# Patient Record
Sex: FEMALE | Race: BLACK OR AFRICAN AMERICAN | Employment: UNEMPLOYED | URBAN - METROPOLITAN AREA
[De-identification: names, ages, dates, MRNs, and addresses within clinical notes are randomized per-mention and may not be internally consistent; named-entity substitution may affect disease eponyms.]

---

## 2017-02-23 ENCOUNTER — TELEPHONE (OUTPATIENT)
Dept: INTERNAL MEDICINE CLINIC | Age: 22
End: 2017-02-23

## 2017-02-23 ENCOUNTER — HOSPITAL ENCOUNTER (OUTPATIENT)
Dept: LAB | Age: 22
Discharge: HOME | End: 2017-02-23
Payer: COMMERCIAL

## 2017-02-23 ENCOUNTER — OFFICE VISIT (OUTPATIENT)
Dept: INTERNAL MEDICINE CLINIC | Age: 22
End: 2017-02-23

## 2017-02-23 VITALS
SYSTOLIC BLOOD PRESSURE: 112 MMHG | BODY MASS INDEX: 19.31 KG/M2 | RESPIRATION RATE: 16 BRPM | DIASTOLIC BLOOD PRESSURE: 64 MMHG | WEIGHT: 109 LBS | TEMPERATURE: 99.3 F | OXYGEN SATURATION: 100 % | HEIGHT: 63 IN | HEART RATE: 67 BPM

## 2017-02-23 DIAGNOSIS — R53.83 FATIGUE, UNSPECIFIED TYPE: ICD-10-CM

## 2017-02-23 DIAGNOSIS — R10.13 EPIGASTRIC PAIN: Primary | ICD-10-CM

## 2017-02-23 DIAGNOSIS — R10.13 EPIGASTRIC PAIN: ICD-10-CM

## 2017-02-23 LAB
ALBUMIN SERPL BCP-MCNC: 3.6 G/DL (ref 3.4–5)
ALBUMIN/GLOB SERPL: 1.1 {RATIO} (ref 0.8–1.7)
ALP SERPL-CCNC: 101 U/L (ref 45–117)
ALT SERPL-CCNC: 15 U/L (ref 13–56)
ANION GAP BLD CALC-SCNC: 6 MMOL/L (ref 3–18)
APPEARANCE UR: CLEAR
AST SERPL W P-5'-P-CCNC: 12 U/L (ref 15–37)
BACTERIA URNS QL MICRO: ABNORMAL /HPF
BASOPHILS # BLD AUTO: 0.1 K/UL (ref 0–0.06)
BASOPHILS # BLD: 1 % (ref 0–2)
BILIRUB SERPL-MCNC: 0.4 MG/DL (ref 0.2–1)
BILIRUB UR QL: NEGATIVE
BUN SERPL-MCNC: 6 MG/DL (ref 7–18)
BUN/CREAT SERPL: 9 (ref 12–20)
CALCIUM SERPL-MCNC: 8.7 MG/DL (ref 8.5–10.1)
CHLORIDE SERPL-SCNC: 107 MMOL/L (ref 100–108)
CO2 SERPL-SCNC: 30 MMOL/L (ref 21–32)
COLOR UR: YELLOW
CREAT SERPL-MCNC: 0.65 MG/DL (ref 0.6–1.3)
DIFFERENTIAL METHOD BLD: ABNORMAL
EOSINOPHIL # BLD: 0.1 K/UL (ref 0–0.4)
EOSINOPHIL NFR BLD: 2 % (ref 0–5)
EPITH CASTS URNS QL MICRO: ABNORMAL /LPF (ref 0–5)
ERYTHROCYTE [DISTWIDTH] IN BLOOD BY AUTOMATED COUNT: 14.6 % (ref 11.6–14.5)
GLOBULIN SER CALC-MCNC: 3.2 G/DL (ref 2–4)
GLUCOSE SERPL-MCNC: 90 MG/DL (ref 74–99)
GLUCOSE UR STRIP.AUTO-MCNC: NEGATIVE MG/DL
HCG URINE, QL. (POC): NEGATIVE
HCT VFR BLD AUTO: 36.1 % (ref 35–45)
HGB BLD-MCNC: 11.6 G/DL (ref 12–16)
HGB UR QL STRIP: NEGATIVE
KETONES UR QL STRIP.AUTO: NEGATIVE MG/DL
LEUKOCYTE ESTERASE UR QL STRIP.AUTO: ABNORMAL
LYMPHOCYTES # BLD AUTO: 43 % (ref 21–52)
LYMPHOCYTES # BLD: 2.4 K/UL (ref 0.9–3.6)
MCH RBC QN AUTO: 29.9 PG (ref 24–34)
MCHC RBC AUTO-ENTMCNC: 32.1 G/DL (ref 31–37)
MCV RBC AUTO: 93 FL (ref 74–97)
MONOCYTES # BLD: 0.4 K/UL (ref 0.05–1.2)
MONOCYTES NFR BLD AUTO: 6 % (ref 3–10)
MUCOUS THREADS URNS QL MICRO: ABNORMAL /LPF
NEUTS SEG # BLD: 2.7 K/UL (ref 1.8–8)
NEUTS SEG NFR BLD AUTO: 48 % (ref 40–73)
NITRITE UR QL STRIP.AUTO: NEGATIVE
PH UR STRIP: 6.5 [PH] (ref 5–8)
PLATELET # BLD AUTO: 163 K/UL (ref 135–420)
PMV BLD AUTO: 12.7 FL (ref 9.2–11.8)
POTASSIUM SERPL-SCNC: 3.8 MMOL/L (ref 3.5–5.5)
PROT SERPL-MCNC: 6.8 G/DL (ref 6.4–8.2)
PROT UR STRIP-MCNC: NEGATIVE MG/DL
RBC # BLD AUTO: 3.88 M/UL (ref 4.2–5.3)
RBC #/AREA URNS HPF: ABNORMAL /HPF (ref 0–5)
SODIUM SERPL-SCNC: 143 MMOL/L (ref 136–145)
SP GR UR REFRACTOMETRY: 1.03 (ref 1–1.03)
TSH SERPL DL<=0.05 MIU/L-ACNC: 0.79 UIU/ML (ref 0.36–3.74)
UROBILINOGEN UR QL STRIP.AUTO: 1 EU/DL (ref 0.2–1)
VALID INTERNAL CONTROL?: YES
WBC # BLD AUTO: 5.7 K/UL (ref 4.6–13.2)
WBC URNS QL MICRO: ABNORMAL /HPF (ref 0–4)

## 2017-02-23 PROCEDURE — 85025 COMPLETE CBC W/AUTO DIFF WBC: CPT | Performed by: FAMILY MEDICINE

## 2017-02-23 PROCEDURE — 80053 COMPREHEN METABOLIC PANEL: CPT | Performed by: FAMILY MEDICINE

## 2017-02-23 PROCEDURE — 84443 ASSAY THYROID STIM HORMONE: CPT | Performed by: FAMILY MEDICINE

## 2017-02-23 PROCEDURE — 36415 COLL VENOUS BLD VENIPUNCTURE: CPT | Performed by: FAMILY MEDICINE

## 2017-02-23 PROCEDURE — 81001 URINALYSIS AUTO W/SCOPE: CPT | Performed by: FAMILY MEDICINE

## 2017-02-23 RX ORDER — OMEPRAZOLE 20 MG/1
20 CAPSULE, DELAYED RELEASE ORAL DAILY
Qty: 30 CAP | Refills: 1 | Status: SHIPPED | OUTPATIENT
Start: 2017-02-23 | End: 2017-11-18

## 2017-02-23 NOTE — PATIENT INSTRUCTIONS

## 2017-02-23 NOTE — PROGRESS NOTES
Pt is here to establish care. C/O stomach issues,  A lot of burping,  epigastric pain x 1 week  L ear pain/throat x 3 days    Do you have an advance directive no  Request Pt bring a copy of advance directive for scanning. Do you want information on an advance directive no    Pt mychart activation pending. 1. Have you been to the ER, urgent care clinic since your last visit? Hospitalized since your last visit? No    2. Have you seen or consulted any other health care providers outside of the [facility] since your last visit? Include any pap smears or colon screening.  No

## 2017-02-23 NOTE — MR AVS SNAPSHOT
Visit Information Date & Time Provider Department Dept. Phone Encounter #  
 2/23/2017 10:30 AM Bjorn Zendejas DO Internists at [facility] [phone] Follow-up Instructions Return in about 1 month (around 3/23/2017) for follow up. Upcoming Health Maintenance Date Due  
 HPV AGE 9Y-34Y (1 of 3 - Female 3 Dose Series) 11/14/2006 INFLUENZA AGE 9 TO ADULT 8/1/2016 DTaP/Tdap/Td series (1 - Tdap) 11/14/2016 PAP AKA CERVICAL CYTOLOGY 11/28/2019 Allergies as of 2/23/2017  Review Complete On: 2/23/2017 By: Bjorn Zendejas DO No Known Allergies Current Immunizations  Never Reviewed No immunizations on file. Not reviewed this visit You Were Diagnosed With   
  
 Codes Comments Epigastric pain    -  Primary ICD-10-CM: R10.13 ICD-9-CM: 789.06 Fatigue, unspecified type     ICD-10-CM: R53.83 ICD-9-CM: 780.79 Vitals BP  
  
  
  
  
  
 112/64 Vitals History BMI and BSA Data Body Mass Index Body Surface Area  
 19.31 kg/m 2 1.48 m 2 Preferred Pharmacy Pharmacy Name Phone [id number] [address] [phone] Your Updated Medication List  
  
   
This list is accurate as of: 2/23/17 11:23 AM.  Always use your most recent med list.  
  
  
  
  
 omeprazole 20 mg capsule Commonly known as:  PRILOSEC Take 1 Cap by mouth daily. Prescriptions Sent to Pharmacy Refills  
 omeprazole (PRILOSEC) 20 mg capsule 1 Sig: Take 1 Cap by mouth daily. Class: Normal  
 Pharmacy: [id number] [address] [facility] Ph #: [phone] Route: Oral  
  
Follow-up Instructions Return in about 1 month (around 3/23/2017) for follow up. To-Do List   
 02/23/2017 Point of Care Testing:  AMB POC URINE PREGNANCY TEST, VISUAL COLOR COMPARISON   
  
 02/23/2017   Lab:  CBC WITH AUTOMATED DIFF   
  
 02/23/2017 Lab:  METABOLIC PANEL, COMPREHENSIVE   
  
 02/23/2017 Lab:  TSH 3RD GENERATION   
  
 02/23/2017 Lab:  URINALYSIS W/MICROSCOPIC Patient Instructions A Healthy Lifestyle: Care Instructions Your Care Instructions A healthy lifestyle can help you feel good, stay at a healthy weight, and have plenty of energy for both work and play. A healthy lifestyle is something you can share with your whole family. A healthy lifestyle also can lower your risk for serious health problems, such as high blood pressure, heart disease, and diabetes. You can follow a few steps listed below to improve your health and the health of your family. Follow-up care is a key part of your treatment and safety. Be sure to make and go to all appointments, and call your doctor if you are having problems. Its also a good idea to know your test results and keep a list of the medicines you take. How can you care for yourself at home? · Do not eat too much sugar, fat, or fast foods. You can still have dessert and treats now and then. The goal is moderation. · Start small to improve your eating habits. Pay attention to portion sizes, drink less juice and soda pop, and eat more fruits and vegetables. ¨ Eat a healthy amount of food. A 3-ounce serving of meat, for example, is about the size of a deck of cards. Fill the rest of your plate with vegetables and whole grains. ¨ Limit the amount of soda and sports drinks you have every day. Drink more water when you are thirsty. ¨ Eat at least 5 servings of fruits and vegetables every day. It may seem like a lot, but it is not hard to reach this goal. A serving or helping is 1 piece of fruit, 1 cup of vegetables, or 2 cups of leafy, raw vegetables. Have an apple or some carrot sticks as an afternoon snack instead of a candy bar. Try to have fruits and/or vegetables at every meal. 
· Make exercise part of your daily routine.  You may want to start with simple activities, such as walking, bicycling, or slow swimming. Try to be active 30 to 60 minutes every day. You do not need to do all 30 to 60 minutes all at once. For example, you can exercise 3 times a day for 10 or 20 minutes. Moderate exercise is safe for most people, but it is always a good idea to talk to your doctor before starting an exercise program. 
· Keep moving. Gildardo Hebert the lawn, work in the garden, or [employer]. Take the stairs instead of the elevator at work. · If you smoke, quit. People who smoke have an increased risk for heart attack, stroke, cancer, and other lung illnesses. Quitting is hard, but there are ways to boost your chance of quitting tobacco for good. ¨ Use nicotine gum, patches, or lozenges. ¨ Ask your doctor about stop-smoking programs and medicines. ¨ Keep trying. In addition to reducing your risk of diseases in the future, you will notice some benefits soon after you stop using tobacco. If you have shortness of breath or asthma symptoms, they will likely get better within a few weeks after you quit. · Limit how much alcohol you drink. Moderate amounts of alcohol (up to 2 drinks a day for men, 1 drink a day for women) are okay. But drinking too much can lead to liver problems, high blood pressure, and other health problems. Family health If you have a family, there are many things you can do together to improve your health. · Eat meals together as a family as often as possible. · Eat healthy foods. This includes fruits, vegetables, lean meats and dairy, and whole grains. · Include your family in your fitness plan. Most people think of activities such as jogging or tennis as the way to fitness, but there are many ways you and your family can be more active. Anything that makes you breathe hard and gets your heart pumping is exercise. Here are some tips: 
¨ Walk to do errands or to take your child to school or the bus. ¨ Go for a family bike ride after dinner instead of watching TV. Where can you learn more? Go to http://odessa-hernandez.info/. Enter I933 in the search box to learn more about \"A Healthy Lifestyle: Care Instructions. \" Current as of: July 26, 2016 Content Version: 11.1 © 8156-3508 [employer]. Care instructions adapted under license by [employer] (which disclaims liability or warranty for this information). If you have questions about a medical condition or this instruction, always ask your healthcare professional. [address] any warranty or liability for your use of this information. Introducing [facility]! [employer] introduces Access Point patient portal. Now you can access parts of your medical record, email your doctor's office, and request medication refills online. 1. In your internet browser, go to [email or web address] 2. Click on the First Time User? Click Here link in the Sign In box. You will see the New Member Sign Up page. 3. Enter your Access Point Access Code exactly as it appears below. You will not need to use this code after youve completed the sign-up process. If you do not sign up before the expiration date, you must request a new code. · Access Point Access Code: [email or web address] Expires: 3/16/2017  9:55 AM 
 
4. Enter the last four digits of your Social Security Number (xxxx) and Date of Birth (mm/dd/yyyy) as indicated and click Submit. You will be taken to the next sign-up page. 5. Create a "[employer]t ID. This will be your Access Point login ID and cannot be changed, so think of one that is secure and easy to remember. 6. Create a Access Point password. You can change your password at any time. 7. Enter your Password Reset Question and Answer. This can be used at a later time if you forget your password. 8. Enter your e-mail address.  You will receive e-mail notification when new information is available in MeetDoctor. 9. Click Sign Up. You can now view and download portions of your medical record. 10. Click the Download Summary menu link to download a portable copy of your medical information. If you have questions, please visit the Frequently Asked Questions section of the MeetDoctor website. Remember, MeetDoctor is NOT to be used for urgent needs. For medical emergencies, dial 911. Now available from your iPhone and Android! Please provide this summary of care documentation to your next provider. Your primary care clinician is listed as Jone Young. If you have any questions after today's visit, please call [phone].

## 2017-02-28 ENCOUNTER — HOSPITAL ENCOUNTER (EMERGENCY)
Age: 22
Discharge: LEFT BEFORE DISCHARGE | End: 2017-02-28
Attending: EMERGENCY MEDICINE
Payer: COMMERCIAL

## 2017-02-28 VITALS
DIASTOLIC BLOOD PRESSURE: 64 MMHG | HEART RATE: 91 BPM | SYSTOLIC BLOOD PRESSURE: 106 MMHG | RESPIRATION RATE: 14 BRPM | BODY MASS INDEX: 20.06 KG/M2 | HEIGHT: 62 IN | TEMPERATURE: 97.9 F | WEIGHT: 109 LBS

## 2017-02-28 LAB
APPEARANCE UR: CLEAR
BACTERIA URNS QL MICRO: ABNORMAL /HPF
BILIRUB UR QL: NEGATIVE
COLOR UR: YELLOW
EPITH CASTS URNS QL MICRO: ABNORMAL /LPF (ref 0–5)
GLUCOSE UR STRIP.AUTO-MCNC: NEGATIVE MG/DL
HCG UR QL: NEGATIVE
HGB UR QL STRIP: NEGATIVE
KETONES UR QL STRIP.AUTO: NEGATIVE MG/DL
LEUKOCYTE ESTERASE UR QL STRIP.AUTO: ABNORMAL
NITRITE UR QL STRIP.AUTO: NEGATIVE
PH UR STRIP: 5 [PH] (ref 5–8)
PROT UR STRIP-MCNC: NEGATIVE MG/DL
RBC #/AREA URNS HPF: ABNORMAL /HPF (ref 0–5)
SP GR UR REFRACTOMETRY: 1.01 (ref 1–1.03)
UROBILINOGEN UR QL STRIP.AUTO: 0.2 EU/DL (ref 0.2–1)
WBC URNS QL MICRO: ABNORMAL /HPF (ref 0–4)

## 2017-02-28 PROCEDURE — 81001 URINALYSIS AUTO W/SCOPE: CPT | Performed by: EMERGENCY MEDICINE

## 2017-02-28 PROCEDURE — 81025 URINE PREGNANCY TEST: CPT | Performed by: EMERGENCY MEDICINE

## 2017-02-28 PROCEDURE — 75810000275 HC EMERGENCY DEPT VISIT NO LEVEL OF CARE

## 2017-02-28 NOTE — PROGRESS NOTES
HISTORY OF PRESENT ILLNESS  Aristeo Mosher is a 24 y.o. female. HPI   She reports 1 week of CP, burping, flatulence, epigastric pain, intermittent CP 5/10, and constant stomach pain. She has not tried any OTC medications  Her diet today was [place], egg and cheese biscuits. Last evening she had lasagna and had symptoms. She says she is \"always tired\" for the past few months. She says she gets 9 hours of sleep  Her left sided throat and ear pain had resolved. LMP 17,  (2 abortions)  Last pap was 2 months ago    No Known Allergies    Past Medical History:   Diagnosis Date    Asthma 1998    Dysmenorrhea in the adolescent 2011    Gonorrhea        Family History   Problem Relation Age of Onset    Hypertension Paternal Grandmother     Diabetes Paternal Grandmother     Asthma Paternal Grandmother     Stroke Paternal Grandmother     Asthma Father     Asthma Brother     Asthma Paternal Uncle        Social History   Substance Use Topics    Smoking status: Never Smoker    Smokeless tobacco: Never Used    Alcohol use Yes      Comment: occassional use        Current Outpatient Prescriptions   Medication Sig    omeprazole (PRILOSEC) 20 mg capsule Take 1 Cap by mouth daily. No current facility-administered medications for this visit. History reviewed. No pertinent surgical history. ROS  Constitutional: Negative for fever and chills. HENT: C/o earache and sore throat. Negative for tinnitus. Eyes: Negative for blurred vision and double vision. Respiratory: C/o SOB. Negative for cough   Cardiovascular: Negative for chest pain and leg swelling. Gastrointestinal: c/o change in bowel habits and black tarry stools. Negative for nausea, vomiting and diarrhea. Genitourinary: C/o dysuria, vaginal d/c, and urgency. Negative for dysuria and flank pain. Musculoskeletal: C/o neck pain and back pain. Negative for myalgias and joint pain. Neurological: C/o headaches. Negative for dizziness, tremors, sensory change, speech change and focal weakness    Psychiatric/Behavioral: Negative for depression and suicidal ideas. Visit Vitals    /64    Pulse 67    Temp 99.3 °F (37.4 °C) (Oral)    Resp 16    Ht 5' 3\" (1.6 m)    Wt 109 lb (49.4 kg)    LMP 02/06/2017 (Exact Date)    SpO2 100%    Breastfeeding No    BMI 19.31 kg/m2     Physical Exam  Constitutional: Obese. Patient is oriented to person, place, and time and well-developed, well-nourished, and in no distress. Head: Normocephalic and atraumatic. Right Ear: ear normal.   Left Ear: ear normal.   Eyes: Pupils are equal, round, and reactive to light. Neck/thyroid: no thyromegaly or masses. Normal range of motion. Cardiovascular: Normal rate, regular rhythm, normal heart sounds and intact distal pulses. No murmur heard. Pulmonary/Chest: Effort normal and breath sounds normal. No respiratory distress. Musculoskeletal: Normal range of motion. No edema. Neurological: Reflexes intact and symetrical.  he is alert and oriented to person, place, and time. Gait normal.   Skin: Skin is warm and dry. Psychiatric: Mood, memory, affect and judgment normal.   ASSESSMENT and PLAN    ICD-10-CM ICD-9-CM    1. Epigastric pain R10.13 789.06 omeprazole (PRILOSEC) 20 mg capsule      CBC WITH AUTOMATED DIFF      METABOLIC PANEL, COMPREHENSIVE      URINALYSIS W/MICROSCOPIC      AMB POC URINE PREGNANCY TEST, VISUAL COLOR COMPARISON   2. Fatigue, unspecified type R53.83 780.79 TSH 3RD GENERATION      AMB POC URINE PREGNANCY TEST, VISUAL COLOR COMPARISON      CANCELED: AMB POC URINE PREGNANCY TEST, VISUAL COLOR COMPARISON     -Send for old records  -RTC 1 month follow up     Additional Instructions: The patient understands that they should contact the office at any time if any questions or concerns develop.   They are also aware that they can call our main office number at [phone] at any time if they would like to address any concerns with the physician. They also understand that they should dial 911 if any acute emergency arises. The patient understands that they should give us a minimum of 48 hours to complete prescription refills once they are requested. The patient has also been instructed to contact us by calling the main office number if they have not received feedback within 2 weeks of having any tests completed. The patient is a aware that they should read all package insert information when picking up the medications and that they should consult the pharmacist of a physician if they have any questions or concerns regarding the prescribed medications. Discussed with the patient new medications given and patient instructed to read pharmacy literature regarding side effects and drug interactions. Instructions for taking the medications were provided to the patient and the consequences of not taking it. Follow-up Disposition:   Return if symptoms worsen or fail to improve. Risk and benefits of new medication discussed in detail when indicated, patient was given the opportunity to ask questions   AVS provided  reviewed diet, exercise and weight control when indicated  Alarm signals discussed. ER precautions reviewed when indicated  Plan of care reviewed with patient. Understanding verbalized and they are in agreement with plan of care.      Marvin Cornejo, DO

## 2017-02-28 NOTE — ED NOTES
No answer in the [address] [place] [address] twice, did not notify any one that she was leaving.

## 2017-03-16 ENCOUNTER — OFFICE VISIT (OUTPATIENT)
Dept: OBGYN CLINIC | Age: 22
End: 2017-03-16

## 2017-03-16 VITALS
WEIGHT: 107 LBS | DIASTOLIC BLOOD PRESSURE: 77 MMHG | SYSTOLIC BLOOD PRESSURE: 116 MMHG | HEART RATE: 99 BPM | BODY MASS INDEX: 19.69 KG/M2 | HEIGHT: 62 IN

## 2017-03-16 DIAGNOSIS — N89.8 VAGINAL DISCHARGE: ICD-10-CM

## 2017-03-16 DIAGNOSIS — Z11.3 SCREEN FOR STD (SEXUALLY TRANSMITTED DISEASE): Primary | ICD-10-CM

## 2017-03-16 NOTE — PROGRESS NOTES
SUBJ: Ms. Barbara Jerome is a 24 y.o. y/o female, , who presents today for     Chief Complaint   Patient presents with    Exposure to STD       Her LMP was Patient's last menstrual period was 2017. rAya Garrett presents for STD screen. Patient is no longer pregnant following elective termination, menses has resumed regularly without difficulty. She states that she has a \"milky\" vaginal discharge with yellow hue. Denies itching, odor, dysuria or change in sex partner. No abnormal bleeding or pain during/after sex. No known STI contact per pt. Past Medical History:   Diagnosis Date    Asthma 0    Dysmenorrhea in the adolescent 2011    Gonorrhea       History reviewed. No pertinent surgical history. Family History   Problem Relation Age of Onset    Hypertension Paternal Grandmother     Diabetes Paternal Grandmother     Asthma Paternal Grandmother     Stroke Paternal Grandmother     Asthma Father     Asthma Brother     Asthma Paternal Uncle      Social History     Social History    Marital status: SINGLE     Spouse name: N/A    Number of children: N/A    Years of education: N/A     Social History Main Topics    Smoking status: Never Smoker    Smokeless tobacco: Never Used    Alcohol use Yes      Comment: occassional use    Drug use: No      Comment: denies    Sexual activity: Yes     Partners: Male     Birth control/ protection: Condom     Other Topics Concern    None     Social History Narrative         PE:   Visit Vitals    /77 (BP 1 Location: Left arm, BP Patient Position: Sitting)    Pulse 99    Ht 5' 2\" (1.575 m)    Wt 107 lb (48.5 kg)    LMP 2017    BMI 19.57 kg/m2       Pt is a well developed female who is alert and oriented x 3. CVS exam: normal rate, regular rhythm, normal S1, S2, no murmurs, rubs, clicks or gallops.   Lungs: clear bilaterally to auscultation, no wheezing, rhonci or rales  Pelvic exam: VULVA: normal appearing vulva with no masses, tenderness or lesions, VAGINA: vaginal discharge - white, CERVIX: normal appearing cervix without discharge or lesions, DNA probe for chlamydia and GC obtained, cervical motion tenderness absent, UTERUS: uterus is normal size, shape, consistency and nontender, ADNEXA: normal adnexa in size, nontender and no masses. Assessment/Plan:       1. Vaginal discharge    - CT/NG/T.VAGINALIS AMPLIFICATION; Future --> pending    2.  Screen for STD (sexually transmitted disease)  Discussed safe sex   Declines hormonal birth control at this time    RCT annually and prn

## 2017-03-16 NOTE — PATIENT INSTRUCTIONS
Safer Sex: Care Instructions  Your Care Instructions  Safer sex is a way to reduce your risk of getting an infection spread through sex. It can also help prevent pregnancy. Most infections that are spread through sex, also called sexually transmitted infections or STIs, can be cured. But some can decrease your chances of getting pregnant if they are not treated early. Others, such as herpes, have no cure. And some, such as HIV, can be deadly. Several products can help you practice safer sex and reduce your chance of STIs. One of the best is a condom. There are condoms for men and for women. The female condom is a tube of soft plastic with a closed end that is placed deep into the vagina. You can use a special rubber sheet (dental dam) for protection during oral sex. Latex gloves can keep your hands from touching blood, semen, or other body fluids that can carry infections. Remember that birth control methods such as diaphragms, IUDs, foams, and birth control pills do not stop you from getting STIs. Follow-up care is a key part of your treatment and safety. Be sure to make and go to all appointments, and call your doctor if you are having problems. Its also a good idea to know your test results and keep a list of the medicines you take. How can you care for yourself at home? · Think about getting shots to prevent hepatitis A and hepatitis B. These two diseases can be spread through sex. You also can get hepatitis A if you eat infected food. · Use condoms or female condoms each time and every time you have sex. · Learn the right way to use a male condom:  ¨ Condoms come in several sizes. Make sure you use the right size. A condom that is too small can break easily. A condom that is too big can slip off during sex. Use a new condom each time you have sex. ¨ Be careful not to poke a hole in the condom when you open the wrapper. ¨ Squeeze the tip of the condom to keep out air.   ¨ Pull down the loose skin (foreskin) from the head of an uncircumcised penis. ¨ While squeezing the tip of the condom, unroll it all the way down to the base of the firm penis. ¨ Never use petroleum jelly (such as Vaseline), grease, hand lotion, baby oil, or anything with oil in it. These products can make holes in the condom. ¨ After sex, hold the condom on your penis as you remove your penis from your partner. This will keep semen from spilling out of the condom. · Learn to use a female condom:  ¨ You can put in a female condom up to 8 hours before sex. ¨ Squeeze the smaller ring at the closed end and insert it deep into the vagina. The larger ring at the open end should stay outside the vagina. ¨ During sex, make sure the penis goes into the condom. ¨ After the penis is removed, close the open end of the condom by twisting it. Remove the condom. · Do not use a female condom and male condom at the same time. · Do not have sex with anyone who has symptoms of an STI, such as sores on the genitals or mouth. The herpes virus that causes cold sores can spread to and from the penis and vagina. · Do not drink a lot of alcohol or use drugs before sex. This can cause you to let down your guard and not practice safer sex. · Having one sex partner (who does not have STIs and does not have sex with anyone else) is a sure way to avoid STIs. · Talk to your partner before you have sex. Find out if he or she has or is at risk for any STI. Keep in mind that a person may be able to spread an STI even if he or she does not have symptoms. You and your partner may want to get an HIV test. You should get tested again 6 months later. Where can you learn more? Go to http://odessa-hernandez.info/. Enter M362 in the search box to learn more about \"Safer Sex: Care Instructions. \"  Current as of: May 27, 2016  Content Version: 11.1  © 2116-0664 [employer].  Care instructions adapted under license by [employer] (which disclaims liability or warranty for this information). If you have questions about a medical condition or this instruction, always ask your healthcare professional. [address] any warranty or liability for your use of this information.

## 2017-03-18 LAB
C TRACH RRNA SPEC QL NAA+PROBE: POSITIVE
N GONORRHOEA RRNA SPEC QL NAA+PROBE: POSITIVE
T VAGINALIS RRNA SPEC QL NAA+PROBE: NEGATIVE

## 2017-03-20 ENCOUNTER — TELEPHONE (OUTPATIENT)
Dept: OBGYN CLINIC | Age: 22
End: 2017-03-20

## 2017-03-20 DIAGNOSIS — A64 STI (SEXUALLY TRANSMITTED INFECTION): Primary | ICD-10-CM

## 2017-03-20 RX ORDER — AZITHROMYCIN 250 MG/1
TABLET, FILM COATED ORAL
Qty: 4 TAB | Refills: 0 | Status: SHIPPED | OUTPATIENT
Start: 2017-03-20 | End: 2017-03-25

## 2017-03-20 NOTE — PROGRESS NOTES
Please notify patient of positive STI results. Rx has been sent to her pharmacy, and she will also need to come back to the office ASAP for rocephin injection. All partners should be treated and she should abstain from sex for 7-10 days. Please encourage condoms and safe sex practices.

## 2017-03-20 NOTE — LETTER
3/20/2017 9:40 AM 
 
Ms. Ankur Merrill [address] [phone] Dear Ms. Barboza, We have been unable to reach you by phone to notify you of your test results. Please call our office at [phone] and ask to speak with my nurse in order to explain these results to you and advise you of any recommendations. Sincerely, Eliz Washington CNM

## 2017-03-20 NOTE — TELEPHONE ENCOUNTER
----- Message from Wilma Banks CNM sent at 3/20/2017  9:09 AM EDT -----  Please notify patient of positive STI results. Rx has been sent to her pharmacy, and she will also need to come back to the office ASAP for rocephin injection. All partners should be treated and she should abstain from sex for 7-10 days. Please encourage condoms and safe sex practices.

## 2017-05-03 ENCOUNTER — HOSPITAL ENCOUNTER (EMERGENCY)
Age: 22
Discharge: HOME | End: 2017-05-03
Attending: EMERGENCY MEDICINE
Payer: COMMERCIAL

## 2017-05-03 VITALS
HEIGHT: 62 IN | BODY MASS INDEX: 21.16 KG/M2 | RESPIRATION RATE: 18 BRPM | DIASTOLIC BLOOD PRESSURE: 65 MMHG | WEIGHT: 115 LBS | SYSTOLIC BLOOD PRESSURE: 126 MMHG | TEMPERATURE: 98.2 F | OXYGEN SATURATION: 100 % | HEART RATE: 78 BPM

## 2017-05-03 DIAGNOSIS — R10.2 PELVIC PAIN: Primary | ICD-10-CM

## 2017-05-03 DIAGNOSIS — Z20.2 POSSIBLE EXPOSURE TO STD: ICD-10-CM

## 2017-05-03 LAB
APPEARANCE UR: CLEAR
BACTERIA URNS QL MICRO: ABNORMAL /HPF
BILIRUB UR QL: NEGATIVE
COLOR UR: YELLOW
EPITH CASTS URNS QL MICRO: ABNORMAL /LPF (ref 0–5)
GLUCOSE UR STRIP.AUTO-MCNC: NEGATIVE MG/DL
HCG UR QL: NEGATIVE
HGB UR QL STRIP: ABNORMAL
KETONES UR QL STRIP.AUTO: ABNORMAL MG/DL
LEUKOCYTE ESTERASE UR QL STRIP.AUTO: NEGATIVE
NITRITE UR QL STRIP.AUTO: NEGATIVE
PH UR STRIP: 6 [PH] (ref 5–8)
PROT UR STRIP-MCNC: NEGATIVE MG/DL
RBC #/AREA URNS HPF: ABNORMAL /HPF (ref 0–5)
SERVICE CMNT-IMP: NORMAL
SP GR UR REFRACTOMETRY: 1.03 (ref 1–1.03)
UROBILINOGEN UR QL STRIP.AUTO: 1 EU/DL (ref 0.2–1)
WBC URNS QL MICRO: ABNORMAL /HPF (ref 0–4)
WET PREP GENITAL: NORMAL

## 2017-05-03 PROCEDURE — 74011000250 HC RX REV CODE- 250: Performed by: PHYSICIAN ASSISTANT

## 2017-05-03 PROCEDURE — 74011250636 HC RX REV CODE- 250/636: Performed by: PHYSICIAN ASSISTANT

## 2017-05-03 PROCEDURE — 87210 SMEAR WET MOUNT SALINE/INK: CPT | Performed by: PHYSICIAN ASSISTANT

## 2017-05-03 PROCEDURE — 99284 EMERGENCY DEPT VISIT MOD MDM: CPT

## 2017-05-03 PROCEDURE — 74011250637 HC RX REV CODE- 250/637: Performed by: PHYSICIAN ASSISTANT

## 2017-05-03 PROCEDURE — 81001 URINALYSIS AUTO W/SCOPE: CPT | Performed by: PHYSICIAN ASSISTANT

## 2017-05-03 PROCEDURE — 87491 CHLMYD TRACH DNA AMP PROBE: CPT | Performed by: PHYSICIAN ASSISTANT

## 2017-05-03 PROCEDURE — 96372 THER/PROPH/DIAG INJ SC/IM: CPT

## 2017-05-03 PROCEDURE — 81025 URINE PREGNANCY TEST: CPT | Performed by: PHYSICIAN ASSISTANT

## 2017-05-03 RX ORDER — AZITHROMYCIN 250 MG/1
1000 TABLET, FILM COATED ORAL
Status: COMPLETED | OUTPATIENT
Start: 2017-05-03 | End: 2017-05-03

## 2017-05-03 RX ORDER — CEFTRIAXONE 250 MG/8ML
250 INJECTION, POWDER, FOR SOLUTION INTRAMUSCULAR; INTRAVENOUS ONCE
Status: DISCONTINUED | OUTPATIENT
Start: 2017-05-03 | End: 2017-05-03

## 2017-05-03 RX ADMIN — LIDOCAINE HYDROCHLORIDE 250 MG: 10 INJECTION, SOLUTION EPIDURAL; INFILTRATION; INTRACAUDAL; PERINEURAL at 22:19

## 2017-05-03 RX ADMIN — AZITHROMYCIN 1000 MG: 250 TABLET, FILM COATED ORAL at 22:19

## 2017-05-04 LAB
C TRACH RRNA SPEC QL NAA+PROBE: POSITIVE
N GONORRHOEA RRNA SPEC QL NAA+PROBE: NEGATIVE
SPECIMEN SOURCE: ABNORMAL

## 2017-05-04 NOTE — ED TRIAGE NOTES
Pt states she is having lower abdominal pain that started last night, pt states she is currently on her period.

## 2017-05-04 NOTE — ED PROVIDER NOTES
HPI Comments: 19yo female presents to ER complaining of pelvic discomfort, concerns about STD, and requesting treatment for STD. Admits to recent history of gonorrhea, treated, thinks her partner was treated, and admits to unprotected sex with same partner that gave her gonorrhea. Patient states she was treated. No urinary complaints, no fever/chills, N/V, abdominal pain. States LMP ended several days ago. Admits to discomfort with intercourse. According to chart reviewed she was positive for both chlamydia and gonorrhea in March. Patient is a 24 y.o. female presenting with abdominal pain and other event. Abdominal Pain    Pertinent negatives include no fever, no nausea, no vomiting, no dysuria and no back pain. Other   Pertinent negatives include no abdominal pain. Past Medical History:   Diagnosis Date    Asthma 0    Dysmenorrhea in the adolescent 8/2/2011    Gonorrhea        History reviewed. No pertinent surgical history. Family History:   Problem Relation Age of Onset    Hypertension Paternal Grandmother     Diabetes Paternal Grandmother     Asthma Paternal Grandmother     Stroke Paternal Grandmother     Asthma Father     Asthma Brother     Asthma Paternal Uncle        Social History     Social History    Marital status: SINGLE     Spouse name: N/A    Number of children: N/A    Years of education: N/A     Occupational History    Not on file. Social History Main Topics    Smoking status: Never Smoker    Smokeless tobacco: Never Used    Alcohol use Yes      Comment: occassional use    Drug use: No      Comment: denies    Sexual activity: Yes     Partners: Male     Birth control/ protection: Condom     Other Topics Concern    Not on file     Social History Narrative         ALLERGIES: Review of patient's allergies indicates no known allergies. Review of Systems   Constitutional: Negative for activity change, appetite change, chills and fever.    HENT: Negative. Respiratory: Negative. Gastrointestinal: Negative for abdominal pain, nausea and vomiting. Genitourinary: Positive for dyspareunia, pelvic pain, vaginal bleeding and vaginal discharge. Negative for difficulty urinating, dysuria, menstrual problem and vaginal pain. Musculoskeletal: Negative. Negative for back pain and gait problem. Neurological: Negative. All other systems reviewed and are negative. Vitals:    05/03/17 2110   BP: 126/65   Pulse: 78   Resp: 18   Temp: 98.2 °F (36.8 °C)   SpO2: 100%   Weight: 52.2 kg (115 lb)   Height: 5' 2\" (1.575 m)            Physical Exam   Constitutional: She is oriented to person, place, and time. She appears well-developed and well-nourished. No distress. HENT:   Mouth/Throat: Oropharynx is clear and moist.   Neck: Normal range of motion. Pulmonary/Chest: Effort normal.   Abdominal: Soft. Bowel sounds are normal. She exhibits no distension and no mass. There is tenderness. There is no rebound, no guarding and no CVA tenderness. Musculoskeletal: Normal range of motion. Neurological: She is alert and oriented to person, place, and time. Skin: She is not diaphoretic. Nursing note and vitals reviewed. MDM  Number of Diagnoses or Management Options  Diagnosis management comments: 17yo female with concerns about STD. Mild CMT, scant clear mucousy discharge mixed with old blood (patient stating her cycle ended a few days ago). Will check UA, UPT, wet prep, STD cultures. Empiric treatment with zithromax and rocephin. STD clinic referral.     ED Course       Pelvic Exam  Date/Time: 5/3/2017 9:45 PM  Procedure duration:  5 minutes. Exam assisted by:  SARAN Byrd RN. Type of exam performed: bimanual and speculum. External genitalia appearance: normal.    Vaginal exam:  discharge. The amount of discharge was:  scant. The discharge was mucousy, thin and bloody (Clear).    Cervical exam:  discharge from cervix, os closed and minimal cervical motion tenderness. Specimen(s) collected:  chlamydia and GC (wet prep).   Bimanual exam:  normal.

## 2017-11-18 ENCOUNTER — HOSPITAL ENCOUNTER (EMERGENCY)
Age: 22
Discharge: HOME | End: 2017-11-18
Attending: EMERGENCY MEDICINE
Payer: COMMERCIAL

## 2017-11-18 VITALS
RESPIRATION RATE: 20 BRPM | DIASTOLIC BLOOD PRESSURE: 44 MMHG | BODY MASS INDEX: 21.53 KG/M2 | OXYGEN SATURATION: 98 % | HEART RATE: 81 BPM | TEMPERATURE: 99 F | SYSTOLIC BLOOD PRESSURE: 105 MMHG | HEIGHT: 62 IN | WEIGHT: 117 LBS

## 2017-11-18 DIAGNOSIS — J02.9 ACUTE PHARYNGITIS, UNSPECIFIED ETIOLOGY: Primary | ICD-10-CM

## 2017-11-18 PROCEDURE — 99282 EMERGENCY DEPT VISIT SF MDM: CPT

## 2017-11-18 PROCEDURE — 96372 THER/PROPH/DIAG INJ SC/IM: CPT

## 2017-11-18 PROCEDURE — 74011250636 HC RX REV CODE- 250/636: Performed by: PHYSICIAN ASSISTANT

## 2017-11-18 RX ADMIN — PENICILLIN G BENZATHINE 2.4 MILLION UNITS: 2400000 INJECTION, SUSPENSION INTRAMUSCULAR at 13:21

## 2017-11-18 NOTE — ED NOTES
Patient armband removed and shredded. Pt left before shot time discharge. Pt seen leaving department by another nurse, in stable condition and in no acute distress.

## 2017-11-18 NOTE — DISCHARGE INSTRUCTIONS
Rapid Strep Test: About This Test  What is it? A rapid strep test checks the bacteria in your throat to see if strep is the cause of your sore throat. Why is this test done? It may be done so your doctor can find out right away whether you have strep throat. There is another test for strep, called a throat culture, but that test takes a few days to get the results. How can you prepare for the test?  You don't need to do anything before you have this test.  What happens during the test?  · You will be asked to tilt your head back and open your mouth as wide as possible. · Your doctor will press your tongue down with a flat stick (tongue depressor) and then examine your mouth and throat. · A clean cotton swab will be rubbed over the back of your throat, around your tonsils, and over any red areas or sores to collect a sample. How long does the test take? · The test takes less than a minute. · Results are available in 10 to 15 minutes. Follow-up care is a key part of your treatment and safety. Be sure to make and go to all appointments, and call your doctor if you are having problems. It's also a good idea to keep a list of the medicines you take. Ask your doctor when you can expect to have your test results. Where can you learn more? Go to http://odessa-hernandez.info/. Enter B356 in the search box to learn more about \"Rapid Strep Test: About This Test.\"  Current as of: May 12, 2017  Content Version: 11.4  © 3178-1678 Healthwise, [facility]. Care instructions adapted under license by [employer] (which disclaims liability or warranty for this information). If you have questions about a medical condition or this instruction, always ask your healthcare professional. [address] any warranty or liability for your use of this information.

## 2017-11-18 NOTE — ED NOTES
Pt medicated per order, without incidient. Pt tolerated well. As mentioned, medication divided into 2 equal dosages for administration. Pt aware of shot time discharge. Pt in NAD.

## 2017-11-18 NOTE — ED PROVIDER NOTES
Patient is a 25 y.o. female presenting with sore throat and general illness. The history is provided by the patient. Sore Throat    This is a new problem. The current episode started yesterday. The problem has not changed since onset. There has been no fever. Associated symptoms include swollen glands. Pertinent negatives include no diarrhea, no vomiting, no congestion, no drooling, no ear discharge, no ear pain, no headaches, no plugged ear sensation, no shortness of breath, no trouble swallowing, no stiff neck and no cough. She has had exposure to strep. She has tried nothing for the symptoms. The treatment provided no relief. Generalized Body Aches   Pertinent negatives include no chest pain, no abdominal pain, no headaches and no shortness of breath. Past Medical History:   Diagnosis Date    Asthma 0    Dysmenorrhea in the adolescent 8/2/2011    Gonorrhea        History reviewed. No pertinent surgical history. Family History:   Problem Relation Age of Onset    Hypertension Paternal Grandmother     Diabetes Paternal Grandmother     Asthma Paternal Grandmother     Stroke Paternal Grandmother     Asthma Father     Asthma Brother     Asthma Paternal Uncle        Social History     Social History    Marital status: SINGLE     Spouse name: N/A    Number of children: N/A    Years of education: N/A     Occupational History    Not on file. Social History Main Topics    Smoking status: Never Smoker    Smokeless tobacco: Never Used    Alcohol use Yes      Comment: occassional use    Drug use: No      Comment: denies    Sexual activity: Yes     Partners: Male     Birth control/ protection: Condom     Other Topics Concern    Not on file     Social History Narrative         ALLERGIES: Review of patient's allergies indicates no known allergies. Review of Systems   Constitutional: Negative for chills and fever. HENT: Positive for sore throat.  Negative for congestion, drooling, ear discharge, ear pain, facial swelling and trouble swallowing. Eyes: Negative for discharge and redness. Respiratory: Negative for cough and shortness of breath. Cardiovascular: Negative for chest pain. Gastrointestinal: Negative for abdominal pain, diarrhea, nausea and vomiting. Endocrine: Negative. Genitourinary: Negative. Musculoskeletal: Negative for back pain and neck pain. Skin: Negative for rash and wound. Allergic/Immunologic: Negative. Neurological: Negative for dizziness, light-headedness and headaches. Hematological: Negative. Psychiatric/Behavioral: Negative. Vitals:    11/18/17 1253   BP: 105/44   Pulse: 81   Resp: 20   Temp: 99 °F (37.2 °C)   SpO2: 98%   Weight: 53.1 kg (117 lb)   Height: 5' 2\" (1.575 m)            Physical Exam   Constitutional: She is oriented to person, place, and time. She appears well-developed and well-nourished. No distress. HENT:   Head: Normocephalic and atraumatic. Right Ear: External ear normal.   Left Ear: External ear normal.   Nose: Nose normal.   Mouth/Throat: Oropharyngeal exudate present. Oral pharynx erythema  No abscess   Eyes: Conjunctivae and EOM are normal.   Neck: Normal range of motion. Neck supple. Cardiovascular: Normal rate, regular rhythm and normal heart sounds. Pulmonary/Chest: Effort normal and breath sounds normal. No respiratory distress. She has no wheezes. She exhibits no tenderness. Abdominal: Soft. She exhibits no distension. There is no tenderness. Musculoskeletal: Normal range of motion. Lymphadenopathy:     She has cervical adenopathy. Neurological: She is alert and oriented to person, place, and time. No cranial nerve deficit. Coordination normal.   Skin: Skin is warm and dry. No rash noted. She is not diaphoretic. Psychiatric: She has a normal mood and affect. Nursing note and vitals reviewed.        MDM  Number of Diagnoses or Management Options  Acute pharyngitis, unspecified etiology: Diagnosis management comments: centor criteria applied and will tx pt for strep   pcn shot given in ED    ED Course       Procedures

## 2017-12-12 ENCOUNTER — HOSPITAL ENCOUNTER (EMERGENCY)
Age: 22
Discharge: LEFT BEFORE DISCHARGE | End: 2017-12-12
Attending: EMERGENCY MEDICINE
Payer: COMMERCIAL

## 2017-12-12 VITALS
TEMPERATURE: 98.5 F | RESPIRATION RATE: 20 BRPM | WEIGHT: 120 LBS | DIASTOLIC BLOOD PRESSURE: 65 MMHG | HEIGHT: 62 IN | SYSTOLIC BLOOD PRESSURE: 110 MMHG | BODY MASS INDEX: 22.08 KG/M2 | HEART RATE: 80 BPM | OXYGEN SATURATION: 98 %

## 2017-12-12 DIAGNOSIS — J45.21 MILD INTERMITTENT ASTHMA WITH ACUTE EXACERBATION: Primary | ICD-10-CM

## 2017-12-12 LAB
APPEARANCE UR: CLEAR
BILIRUB UR QL: NEGATIVE
COLOR UR: YELLOW
GLUCOSE UR STRIP.AUTO-MCNC: NEGATIVE MG/DL
HCG UR QL: NEGATIVE
HGB UR QL STRIP: NEGATIVE
KETONES UR QL STRIP.AUTO: 40 MG/DL
LEUKOCYTE ESTERASE UR QL STRIP.AUTO: NEGATIVE
NITRITE UR QL STRIP.AUTO: NEGATIVE
PH UR STRIP: 7.5 [PH] (ref 5–8)
PROT UR STRIP-MCNC: NEGATIVE MG/DL
SP GR UR REFRACTOMETRY: 1.02 (ref 1–1.03)
UROBILINOGEN UR QL STRIP.AUTO: 1 EU/DL (ref 0.2–1)

## 2017-12-12 PROCEDURE — 74011636637 HC RX REV CODE- 636/637: Performed by: PHYSICIAN ASSISTANT

## 2017-12-12 PROCEDURE — 99282 EMERGENCY DEPT VISIT SF MDM: CPT

## 2017-12-12 PROCEDURE — 94640 AIRWAY INHALATION TREATMENT: CPT

## 2017-12-12 PROCEDURE — 81025 URINE PREGNANCY TEST: CPT | Performed by: PHYSICIAN ASSISTANT

## 2017-12-12 PROCEDURE — 77030029684 HC NEB SM VOL KT MONA -A

## 2017-12-12 PROCEDURE — 74011250637 HC RX REV CODE- 250/637: Performed by: PHYSICIAN ASSISTANT

## 2017-12-12 PROCEDURE — 81003 URINALYSIS AUTO W/O SCOPE: CPT | Performed by: PHYSICIAN ASSISTANT

## 2017-12-12 PROCEDURE — 74011000250 HC RX REV CODE- 250: Performed by: PHYSICIAN ASSISTANT

## 2017-12-12 RX ORDER — IBUPROFEN 400 MG/1
400 TABLET ORAL
Status: COMPLETED | OUTPATIENT
Start: 2017-12-12 | End: 2017-12-12

## 2017-12-12 RX ORDER — PREDNISONE 20 MG/1
60 TABLET ORAL
Status: COMPLETED | OUTPATIENT
Start: 2017-12-12 | End: 2017-12-12

## 2017-12-12 RX ORDER — IPRATROPIUM BROMIDE AND ALBUTEROL SULFATE 2.5; .5 MG/3ML; MG/3ML
3 SOLUTION RESPIRATORY (INHALATION)
Status: COMPLETED | OUTPATIENT
Start: 2017-12-12 | End: 2017-12-12

## 2017-12-12 RX ADMIN — PREDNISONE 60 MG: 20 TABLET ORAL at 13:41

## 2017-12-12 RX ADMIN — IPRATROPIUM BROMIDE AND ALBUTEROL SULFATE 3 ML: .5; 3 SOLUTION RESPIRATORY (INHALATION) at 14:13

## 2017-12-12 RX ADMIN — IPRATROPIUM BROMIDE AND ALBUTEROL SULFATE 3 ML: .5; 3 SOLUTION RESPIRATORY (INHALATION) at 13:55

## 2017-12-12 RX ADMIN — IBUPROFEN 400 MG: 400 TABLET ORAL at 14:17

## 2017-12-12 RX ADMIN — IPRATROPIUM BROMIDE AND ALBUTEROL SULFATE 3 ML: .5; 3 SOLUTION RESPIRATORY (INHALATION) at 13:42

## 2017-12-12 NOTE — ED PROVIDER NOTES
HPI Comments: Guerrero Cox is a 25 y.o. Female with hx of asthma c/o SOB and productive cough with clear sputum starting yesterday. She c/o wheezing with SOB. States it felt like an asthma attack. Pt did not have an inhaler at home. Last asthma exacerbation requiring ER visit was as a child. No intubations. She c/o rhinorrhea and sick contact with cold symptoms. Pt has not tried anything at home. She denies fever or chills. No CP, palps. No sore throat. Pt eating normally. She c/o bilateral side pain also starting yesterday after coughing. No urinary symptoms, flank pain, abd pain, n/v/d. Pt states pain is aching, 6/10. Pt requesting medication for side pain, has not taken anything at home. Patient is a 25 y.o. female presenting with cough. The history is provided by the patient. No  was used. Cough   This is a new problem. The current episode started yesterday. The problem occurs constantly. The problem has not changed since onset. The cough is productive of sputum. There has been no fever. Associated symptoms include rhinorrhea, sore throat, myalgias, shortness of breath and wheezing. Pertinent negatives include no chest pain, no chills, no sweats, no weight loss, no eye redness, no ear congestion, no ear pain, no headaches, no nausea and no vomiting. She has tried nothing for the symptoms. She is not a smoker. Her past medical history is significant for asthma. Past Medical History:   Diagnosis Date    Asthma 0    Dysmenorrhea in the adolescent 8/2/2011    Gonorrhea        History reviewed. No pertinent surgical history.       Family History:   Problem Relation Age of Onset    Hypertension Paternal Grandmother     Diabetes Paternal Grandmother     Asthma Paternal Grandmother     Stroke Paternal Grandmother     Asthma Father     Asthma Brother     Asthma Paternal Uncle        Social History     Social History    Marital status: SINGLE Spouse name: N/A    Number of children: N/A    Years of education: N/A     Occupational History    Not on file. Social History Main Topics    Smoking status: Never Smoker    Smokeless tobacco: Never Used    Alcohol use Yes      Comment: occassional use    Drug use: No      Comment: denies    Sexual activity: Yes     Partners: Male     Birth control/ protection: Condom     Other Topics Concern    Not on file     Social History Narrative         ALLERGIES: Review of patient's allergies indicates no known allergies. Review of Systems   Constitutional: Negative for chills, fever and weight loss. HENT: Positive for rhinorrhea and sore throat. Negative for congestion, ear pain, postnasal drip, sinus pain and sinus pressure. Eyes: Negative for redness and visual disturbance. Respiratory: Positive for cough, choking, shortness of breath and wheezing. Negative for chest tightness. Cardiovascular: Negative for chest pain and palpitations. Gastrointestinal: Negative for abdominal pain, constipation, nausea and vomiting. Genitourinary: Negative for dysuria, flank pain, frequency, hematuria and urgency. Musculoskeletal: Positive for myalgias. Negative for back pain, joint swelling, neck pain and neck stiffness. Skin: Negative. Allergic/Immunologic: Negative. Neurological: Negative for headaches. Vitals:    12/12/17 1330   BP: 110/65   Pulse: 80   Resp: 20   Temp: 98.5 °F (36.9 °C)   SpO2: 98%   Weight: 54.4 kg (120 lb)   Height: 5' 2\" (1.575 m)            Physical Exam   Constitutional: She is oriented to person, place, and time. She appears well-developed and well-nourished. No distress. HENT:   Head: Normocephalic and atraumatic. Nose: Nose normal.   Eyes: Conjunctivae and EOM are normal.   Neck: Normal range of motion. Neck supple. Cardiovascular: Normal rate, regular rhythm and normal heart sounds. Pulmonary/Chest: Effort normal. No accessory muscle usage.  No respiratory distress. She has decreased breath sounds in the right lower field and the left lower field. She has wheezes in the right upper field and the left upper field. Respirations non-labored, speaking in full sentences without distress   Abdominal: Soft. Normal appearance. There is no tenderness. There is no rebound and no CVA tenderness. Musculoskeletal: Normal range of motion. She exhibits no edema. Neurological: She is alert and oriented to person, place, and time. Skin: Skin is warm. She is not diaphoretic. Psychiatric: She has a normal mood and affect. Judgment and thought content normal.   Nursing note and vitals reviewed. MDM  Number of Diagnoses or Management Options  Diagnosis management comments: Hx asthma, pt does not have inhaler. SOB with cough and wheezing x 1 day. Pt afebrile, recent URI symptoms likely exacerbating asthma. Give nebs, prednisone and re-evaluate  needs Albuterol at home. Bilat side pain likely musculoskeletal pain secondary to coughing. Check UA, UCG.   Give Ibuprofen for pain and re-evaluate    ED Course       Procedures    Vitals:  Patient Vitals for the past 12 hrs:   Temp Pulse Resp BP SpO2   12/12/17 1330 98.5 °F (36.9 °C) 80 20 110/65 98 %         Medications ordered:   Medications   albuterol-ipratropium (DUO-NEB) 2.5 MG-0.5 MG/3 ML (3 mL Nebulization Given 12/12/17 1413)   predniSONE (DELTASONE) tablet 60 mg (60 mg Oral Given 12/12/17 1341)   ibuprofen (MOTRIN) tablet 400 mg (400 mg Oral Given 12/12/17 1417)         Lab findings:  Recent Results (from the past 12 hour(s))   URINALYSIS W/ RFLX MICROSCOPIC    Collection Time: 12/12/17  2:20 PM   Result Value Ref Range    Color YELLOW      Appearance CLEAR      Specific gravity 1.020 1.005 - 1.030      pH (UA) 7.5 5.0 - 8.0      Protein NEGATIVE  NEG mg/dL    Glucose NEGATIVE  NEG mg/dL    Ketone 40 (A) NEG mg/dL    Bilirubin NEGATIVE  NEG      Blood NEGATIVE  NEG      Urobilinogen 1.0 0.2 - 1.0 EU/dL    Nitrites NEGATIVE  NEG      Leukocyte Esterase NEGATIVE  NEG     HCG URINE, QL    Collection Time: 12/12/17  2:20 PM   Result Value Ref Range    HCG urine, Ql. NEGATIVE  NEG             X-Ray, CT or other radiology findings or impressions:  No orders to display           Reevaluation of patient:   Pt left treatment area after receiving nebs, prednisone, and motrin but prior to receiving discharge instructions. Was unable to reassess pt's condition after nebs and prior to walking out. Disposition:  Diagnosis:   1.  Mild intermittent asthma with acute exacerbation        Disposition: home    Follow-up Information     Follow up With Details Comments Contact Info    [facility] EMERGENCY DEPT  If symptoms worsen, As needed [address] [place] [id number] DUGLAS Rodriguez., DO Schedule an appointment as soon as possible for a visit              Patient's Medications    No medications on file           Cande Reyes PA-C

## 2017-12-12 NOTE — DISCHARGE INSTRUCTIONS
Asthma Attack: Care Instructions  Your Care Instructions    During an asthma attack, the airways swell and narrow. This makes it hard to breathe. Severe asthma attacks can be life-threatening, but you can help prevent them by keeping your asthma under control and treating symptoms before they get bad. Symptoms include being short of breath, having chest tightness, coughing, and wheezing. Noting and treating these symptoms can also help you avoid future trips to the emergency room. The doctor has checked you carefully, but problems can develop later. If you notice any problems or new symptoms, get medical treatment right away. Follow-up care is a key part of your treatment and safety. Be sure to make and go to all appointments, and call your doctor if you are having problems. It's also a good idea to know your test results and keep a list of the medicines you take. How can you care for yourself at home? · Follow your asthma action plan to prevent and treat attacks. If you don't have an asthma action plan, work with your doctor to create one. · Take your asthma medicines exactly as prescribed. Talk to your doctor right away if you have any questions about how to take them. ¨ Use your quick-relief medicine when you have symptoms of an attack. Quick-relief medicine is usually an albuterol inhaler. Some people need to use quick-relief medicine before they exercise. ¨ Take your controller medicine every day, not just when you have symptoms. Controller medicine is usually an inhaled corticosteroid. The goal is to prevent problems before they occur. Don't use your controller medicine to treat an attack that has already started. It doesn't work fast enough to help. ¨ If your doctor prescribed corticosteroid pills to use during an attack, take them exactly as prescribed. It may take hours for the pills to work, but they may make the episode shorter and help you breathe better.   ¨ Keep your quick-relief medicine with you at all times. · Talk to your doctor before using other medicines. Some medicines, such as aspirin, can cause asthma attacks in some people. · If you have a peak flow meter, use it to check how well you are breathing. This can help you predict when an asthma attack is going to occur. Then you can take medicine to prevent the asthma attack or make it less severe. · Do not smoke or allow others to smoke around you. Avoid smoky places. Smoking makes asthma worse. If you need help quitting, talk to your doctor about stop-smoking programs and medicines. These can increase your chances of quitting for good. · Learn what triggers an asthma attack for you, and avoid the triggers when you can. Common triggers include colds, smoke, air pollution, dust, pollen, mold, pets, cockroaches, stress, and cold air. · Avoid colds and the flu. Get a pneumococcal vaccine shot. If you have had one before, ask your doctor if you need a second dose. Get a flu vaccine every fall. If you must be around people with colds or the flu, wash your hands often. When should you call for help? Call 911 anytime you think you may need emergency care. For example, call if:  ? · You have severe trouble breathing. ?Call your doctor now or seek immediate medical care if:  ? · Your symptoms do not get better after you have followed your asthma action plan. ? · You have new or worse trouble breathing. ? · Your coughing and wheezing get worse. ? · You cough up dark brown or bloody mucus (sputum). ? · You have a new or higher fever. ? Watch closely for changes in your health, and be sure to contact your doctor if:  ? · You need to use quick-relief medicine on more than 2 days a week (unless it is just for exercise). ? · You cough more deeply or more often, especially if you notice more mucus or a change in the color of your mucus. ? · You are not getting better as expected. Where can you learn more?   Go to http://odessa-hernandez.info/. Enter S749 in the search box to learn more about \"Asthma Attack: Care Instructions. \"  Current as of: May 12, 2017  Content Version: 11.4  © 7383-8399 Healthwise, [employer]. Care instructions adapted under license by [employer] (which disclaims liability or warranty for this information). If you have questions about a medical condition or this instruction, always ask your healthcare professional. [place] [address] any warranty or liability for your use of this information.

## 2017-12-12 NOTE — ED NOTES
Witnessed patient ambulate out of ED and get into vehicle and leave. Patient appeared to be in no apparent distress.

## 2017-12-19 ENCOUNTER — APPOINTMENT (OUTPATIENT)
Dept: GENERAL RADIOLOGY | Age: 22
End: 2017-12-19
Attending: NURSE PRACTITIONER
Payer: COMMERCIAL

## 2017-12-19 ENCOUNTER — HOSPITAL ENCOUNTER (EMERGENCY)
Age: 22
Discharge: HOME | End: 2017-12-19
Attending: EMERGENCY MEDICINE
Payer: COMMERCIAL

## 2017-12-19 VITALS
RESPIRATION RATE: 18 BRPM | WEIGHT: 117 LBS | BODY MASS INDEX: 21.53 KG/M2 | OXYGEN SATURATION: 100 % | SYSTOLIC BLOOD PRESSURE: 121 MMHG | TEMPERATURE: 98.4 F | HEART RATE: 68 BPM | HEIGHT: 62 IN | DIASTOLIC BLOOD PRESSURE: 69 MMHG

## 2017-12-19 DIAGNOSIS — J06.9 VIRAL URI WITH COUGH: Primary | ICD-10-CM

## 2017-12-19 DIAGNOSIS — R82.4 URINE KETONES: ICD-10-CM

## 2017-12-19 DIAGNOSIS — M54.6 ACUTE BILATERAL THORACIC BACK PAIN: ICD-10-CM

## 2017-12-19 DIAGNOSIS — N30.00 ACUTE CYSTITIS WITHOUT HEMATURIA: ICD-10-CM

## 2017-12-19 LAB
APPEARANCE UR: CLEAR
BACTERIA URNS QL MICRO: ABNORMAL /HPF
BILIRUB UR QL: NEGATIVE
COLOR UR: ABNORMAL
EPITH CASTS URNS QL MICRO: ABNORMAL /LPF (ref 0–5)
FLUAV AG NPH QL IA: NEGATIVE
FLUBV AG NOSE QL IA: NEGATIVE
GLUCOSE UR STRIP.AUTO-MCNC: NEGATIVE MG/DL
HCG UR QL: NEGATIVE
HGB UR QL STRIP: NEGATIVE
KETONES UR QL STRIP.AUTO: >160 MG/DL
LEUKOCYTE ESTERASE UR QL STRIP.AUTO: ABNORMAL
MUCOUS THREADS URNS QL MICRO: ABNORMAL /LPF
NITRITE UR QL STRIP.AUTO: NEGATIVE
PH UR STRIP: 5.5 [PH] (ref 5–8)
PROT UR STRIP-MCNC: ABNORMAL MG/DL
RBC #/AREA URNS HPF: NEGATIVE /HPF (ref 0–5)
SP GR UR REFRACTOMETRY: >1.03 (ref 1–1.03)
UROBILINOGEN UR QL STRIP.AUTO: 1 EU/DL (ref 0.2–1)
WBC URNS QL MICRO: ABNORMAL /HPF (ref 0–4)

## 2017-12-19 PROCEDURE — 87804 INFLUENZA ASSAY W/OPTIC: CPT | Performed by: EMERGENCY MEDICINE

## 2017-12-19 PROCEDURE — 87086 URINE CULTURE/COLONY COUNT: CPT | Performed by: NURSE PRACTITIONER

## 2017-12-19 PROCEDURE — 81025 URINE PREGNANCY TEST: CPT | Performed by: NURSE PRACTITIONER

## 2017-12-19 PROCEDURE — 81001 URINALYSIS AUTO W/SCOPE: CPT | Performed by: NURSE PRACTITIONER

## 2017-12-19 PROCEDURE — 71020 XR CHEST PA LAT: CPT

## 2017-12-19 PROCEDURE — 99283 EMERGENCY DEPT VISIT LOW MDM: CPT

## 2017-12-19 RX ORDER — ALBUTEROL SULFATE 90 UG/1
AEROSOL, METERED RESPIRATORY (INHALATION)
COMMUNITY
End: 2019-04-10

## 2017-12-19 RX ORDER — IBUPROFEN 400 MG/1
400 TABLET ORAL
Qty: 20 TAB | Refills: 0 | Status: SHIPPED | OUTPATIENT
Start: 2017-12-19 | End: 2018-04-21

## 2017-12-19 RX ORDER — SULFAMETHOXAZOLE AND TRIMETHOPRIM 800; 160 MG/1; MG/1
1 TABLET ORAL 2 TIMES DAILY
Qty: 6 TAB | Refills: 0 | Status: SHIPPED | OUTPATIENT
Start: 2017-12-19 | End: 2017-12-22

## 2017-12-19 NOTE — ED TRIAGE NOTES
Patient states continuing symptoms since last visit. C/o cough that has returned. States flu like symptoms. Denies fever.

## 2017-12-19 NOTE — ED PROVIDER NOTES
HPI Comments: 6:13 PM   25 y.o. female presents to ED C/O cough, wheezing, flu like symptoms. Patient has a HX of asthma. Patient reports cough x 1 week, and tightness, seen her last week for similar, but left prior to finishing treatment and prior to discharge. Patient reports upper back pain  Intermittent x 1 week, denies injury. Patient reports cough productive for clear mucus. Patient denies fever, abdominal pain. Patient reports her chest doesn't feel tight like it did when she was here last week. Patient reports this morning she did vomit 2 times but feels better now, concern she may have had a stomach bug. Patient denies dysuria, fever. Patient is a some day smoker. LMP 11/18/2017. Patient does reports some concern for pregnancy. Patient denies any other symptoms or complaints. The history is provided by the patient. History limited by: No language barrier. Past Medical History:   Diagnosis Date    Asthma 0    Dysmenorrhea in the adolescent 8/2/2011    Gonorrhea        History reviewed. No pertinent surgical history. Family History:   Problem Relation Age of Onset    Hypertension Paternal Grandmother     Diabetes Paternal Grandmother     Asthma Paternal Grandmother     Stroke Paternal Grandmother     Asthma Father     Asthma Brother     Asthma Paternal Uncle        Social History     Social History    Marital status: SINGLE     Spouse name: N/A    Number of children: N/A    Years of education: N/A     Occupational History    Not on file. Social History Main Topics    Smoking status: Never Smoker    Smokeless tobacco: Never Used    Alcohol use Yes      Comment: occassional use    Drug use: No      Comment: denies    Sexual activity: Yes     Partners: Male     Birth control/ protection: Condom     Other Topics Concern    Not on file     Social History Narrative         ALLERGIES: Review of patient's allergies indicates no known allergies.     Review of Systems   Constitutional: Negative for appetite change and fever. HENT: Negative for congestion, rhinorrhea and sore throat. Respiratory: Positive for cough and wheezing. Negative for shortness of breath. Cardiovascular: Negative for chest pain and leg swelling. Gastrointestinal: Positive for nausea and vomiting. Negative for abdominal pain, constipation and diarrhea. Genitourinary: Negative for dysuria. Musculoskeletal: Positive for back pain. Negative for arthralgias. Neurological: Negative for dizziness, syncope and headaches. All other systems reviewed and are negative. Vitals:    12/19/17 1758   BP: 118/76   Pulse: 66   Resp: 18   Temp: 98.4 °F (36.9 °C)   SpO2: 100%   Weight: 53.1 kg (117 lb)   Height: 5' 2\" (1.575 m)            Physical Exam   Constitutional: She is oriented to person, place, and time. She appears well-developed and well-nourished. No distress. Cardiovascular: Normal rate, regular rhythm and normal heart sounds. Pulmonary/Chest: Effort normal and breath sounds normal. No respiratory distress. She has no wheezes. She has no rales. She exhibits no tenderness. Abdominal: Soft. Bowel sounds are normal. There is tenderness in the suprapubic area. There is no rigidity, no rebound, no guarding and no CVA tenderness. Musculoskeletal: Normal range of motion. Back:    Neurological: She is alert and oriented to person, place, and time. She exhibits normal muscle tone. Coordination normal.   Skin: Skin is warm and dry. No rash noted. She is not diaphoretic. No erythema. No pallor. Nursing note and vitals reviewed.        MDM  Number of Diagnoses or Management Options  Acute bilateral thoracic back pain:   Acute cystitis without hematuria:   Urine ketones:   Viral URI with cough:   Diagnosis management comments: DDX - viral URI, bronchitis, pneumonia, influenza, UTI, pregnancy, non traumatic back pain    MDM;  Plan - influenza ordered by triage nurse, I ordered HCG, UA, and chest xray. Progress- > 160 ketones noted in urine, patient has recently had N/V, denies nausea or abdominal pain at this time, influenza negative, HCG negative. UA - small lueks, 4-10 WBC and few bacteria, will order urine culture and treat for UTI due to suprapubic TTP. Chest xray - no acute cardiopulmonary abnormality noted. 7:23 PM patient informed of results. Will treat for UTI, educated about importance of hydration at home. Patient educated to follow-up with PCP in 1 week if symptoms not improved. Patient educated to return to the ED for any new or worsening symptoms.   Questions denied        Amount and/or Complexity of Data Reviewed  Clinical lab tests: ordered and reviewed  Tests in the radiology section of CPT®: ordered and reviewed      ED Course       Procedures             RESULTS:    XR CHEST PA LAT    (Results Pending)       Labs Reviewed   URINALYSIS W/ RFLX MICROSCOPIC - Abnormal; Notable for the following:        Result Value    Specific gravity >1.030 (*)     Protein TRACE (*)     Ketone >160 (*)     Leukocyte Esterase SMALL (*)     All other components within normal limits   URINE MICROSCOPIC ONLY - Abnormal; Notable for the following:     Bacteria FEW (*)     Mucus 2+ (*)     All other components within normal limits   INFLUENZA A & B AG (RAPID TEST)   CULTURE, URINE   HCG URINE, QL       Recent Results (from the past 12 hour(s))   INFLUENZA A & B AG (RAPID TEST)    Collection Time: 12/19/17  6:00 PM   Result Value Ref Range    Influenza A Antigen NEGATIVE  NEG      Influenza B Antigen NEGATIVE  NEG     URINALYSIS W/ RFLX MICROSCOPIC    Collection Time: 12/19/17  6:35 PM   Result Value Ref Range    Color DARK YELLOW      Appearance CLEAR      Specific gravity >1.030 (H) 1.005 - 1.030    pH (UA) 5.5 5.0 - 8.0      Protein TRACE (A) NEG mg/dL    Glucose NEGATIVE  NEG mg/dL    Ketone >160 (A) NEG mg/dL    Bilirubin NEGATIVE  NEG      Blood NEGATIVE  NEG      Urobilinogen 1.0 0.2 - 1.0 EU/dL    Nitrites NEGATIVE  NEG      Leukocyte Esterase SMALL (A) NEG     HCG URINE, QL    Collection Time: 12/19/17  6:35 PM   Result Value Ref Range    HCG urine, Ql. NEGATIVE  NEG     URINE MICROSCOPIC ONLY    Collection Time: 12/19/17  6:35 PM   Result Value Ref Range    WBC 4 to 10 0 - 4 /hpf    RBC NEGATIVE  0 - 5 /hpf    Epithelial cells 2+ 0 - 5 /lpf    Bacteria FEW (A) NEG /hpf    Mucus 2+ (A) NEG /lpf       PROGRESS NOTE:   6:13 PM   Initial assessment completed. Written by Claudio Curtis NP-C    DISCHARGE NOTE:  7:28 PM   Lindsay Potts's  results have been reviewed with her. She has been counseled regarding her diagnosis, treatment, and plan. She verbally conveys understanding and agreement of the signs, symptoms, diagnosis, treatment and prognosis and additionally agrees to follow up as discussed. She also agrees with the care-plan and conveys that all of her questions have been answered. I have also provided discharge instructions for her that include: educational information regarding their diagnosis and treatment, and list of reasons why they would want to return to the ED prior to their follow-up appointment, should her condition change. CLINICAL IMPRESSION:    1. Viral URI with cough    2. Urine ketones    3. Acute bilateral thoracic back pain    4. Acute cystitis without hematuria        AFTER VISIT PLAN:    Current Discharge Medication List      START taking these medications    Details   trimethoprim-sulfamethoxazole (BACTRIM DS) 160-800 mg per tablet Take 1 Tab by mouth two (2) times a day for 3 days. Qty: 6 Tab, Refills: 0      ibuprofen (MOTRIN) 400 mg tablet Take 1 Tab by mouth every six (6) hours as needed for Pain.   Qty: 20 Tab, Refills: 0              Follow-up Information     Follow up With Details Comments [address], [place] Schedule an appointment as soon as possible for a visit in 1 week As needed            Written by Claudio Curtis NP-C

## 2017-12-20 NOTE — DISCHARGE INSTRUCTIONS
Back Pain: Care Instructions  Your Care Instructions    Back pain has many possible causes. It is often related to problems with muscles and ligaments of the back. It may also be related to problems with the nerves, discs, or bones of the back. Moving, lifting, standing, sitting, or sleeping in an awkward way can strain the back. Sometimes you don't notice the injury until later. Arthritis is another common cause of back pain. Although it may hurt a lot, back pain usually improves on its own within several weeks. Most people recover in 12 weeks or less. Using good home treatment and being careful not to stress your back can help you feel better sooner. Follow-up care is a key part of your treatment and safety. Be sure to make and go to all appointments, and call your doctor if you are having problems. It's also a good idea to know your test results and keep a list of the medicines you take. How can you care for yourself at home? · Sit or lie in positions that are most comfortable and reduce your pain. Try one of these positions when you lie down:  ¨ Lie on your back with your knees bent and supported by large pillows. ¨ Lie on the floor with your legs on the seat of a sofa or chair. Rehana Burkett on your side with your knees and hips bent and a pillow between your legs. ¨ Lie on your stomach if it does not make pain worse. · Do not sit up in bed, and avoid soft couches and twisted positions. Bed rest can help relieve pain at first, but it delays healing. Avoid bed rest after the first day of back pain. · Change positions every 30 minutes. If you must sit for long periods of time, take breaks from sitting. Get up and walk around, or lie in a comfortable position. · Try using a heating pad on a low or medium setting for 15 to 20 minutes every 2 or 3 hours. Try a warm shower in place of one session with the heating pad. · You can also try an ice pack for 10 to 15 minutes every 2 to 3 hours.  Put a thin cloth between the ice pack and your skin. · Take pain medicines exactly as directed. ¨ If the doctor gave you a prescription medicine for pain, take it as prescribed. ¨ If you are not taking a prescription pain medicine, ask your doctor if you can take an over-the-counter medicine. · Take short walks several times a day. You can start with 5 to 10 minutes, 3 or 4 times a day, and work up to longer walks. Walk on level surfaces and avoid hills and stairs until your back is better. · Return to work and other activities as soon as you can. Continued rest without activity is usually not good for your back. · To prevent future back pain, do exercises to stretch and strengthen your back and stomach. Learn how to use good posture, safe lifting techniques, and proper body mechanics. When should you call for help? Call your doctor now or seek immediate medical care if:  ? · You have new or worsening numbness in your legs. ? · You have new or worsening weakness in your legs. (This could make it hard to stand up.)   ? · You lose control of your bladder or bowels. ? Watch closely for changes in your health, and be sure to contact your doctor if:  ? · Your pain gets worse. ? · You are not getting better after 2 weeks. Where can you learn more? Go to http://odessa-hernandez.info/. Enter X717 in the search box to learn more about \"Back Pain: Care Instructions. \"  Current as of: March 21, 2017  Content Version: 11.4  © 8223-9504 [employer]. Care instructions adapted under license by [employer] (which disclaims liability or warranty for this information). If you have questions about a medical condition or this instruction, always ask your healthcare professional. [address] any warranty or liability for your use of this information. [employer] Activation    Thank you for requesting access to [employer].  Please follow the instructions below to securely access and download your online medical record. [employer] allows you to send messages to your doctor, view your test results, renew your prescriptions, schedule appointments, and more. How Do I Sign Up? 1. In your internet browser, go to [email or web address].  2. Click on the First Time User? Click Here link in the Sign In box. You will be redirect to the New Member Sign Up page. 3. Enter your [employer] Access Code exactly as it appears below. You will not need to use this code after youve completed the sign-up process. If you do not sign up before the expiration date, you must request a new code. [employer] Access Code: [email or web address]  Expires: 2018  1:10 PM (This is the date your [employer] access code will )    4. Enter the last four digits of your Social Security Number (xxxx) and Date of Birth (mm/dd/yyyy) as indicated and click Submit. You will be taken to the next sign-up page. 5. Create a [employer] ID. This will be your [employer] login ID and cannot be changed, so think of one that is secure and easy to remember. 6. Create a [employer] password. You can change your password at any time. 7. Enter your Password Reset Question and Answer. This can be used at a later time if you forget your password. 8. Enter your e-mail address. You will receive e-mail notification when new information is available in [address] 9. Click Sign Up. You can now view and download portions of your medical record. 10. Click the Download Summary menu link to download a portable copy of your medical information. Additional Information    If you have questions, please visit the Frequently Asked Questions section of the [employer] website at [email or web address] Remember, [employer] is NOT to be used for urgent needs. For medical emergencies, dial 911.

## 2017-12-20 NOTE — ED NOTES
I have reviewed discharge instructions with the patient. The patient verbalized understanding. Patient armband removed and given to patient to take home. Patient was informed of the privacy risks if armband lost or stolen  Current Discharge Medication List      START taking these medications    Details   trimethoprim-sulfamethoxazole (BACTRIM DS) 160-800 mg per tablet Take 1 Tab by mouth two (2) times a day for 3 days. Qty: 6 Tab, Refills: 0      ibuprofen (MOTRIN) 400 mg tablet Take 1 Tab by mouth every six (6) hours as needed for Pain.   Qty: 20 Tab, Refills: 0

## 2017-12-21 LAB
BACTERIA SPEC CULT: NORMAL
SERVICE CMNT-IMP: NORMAL

## 2018-04-21 ENCOUNTER — HOSPITAL ENCOUNTER (EMERGENCY)
Age: 23
Discharge: HOME | End: 2018-04-21
Attending: EMERGENCY MEDICINE
Payer: COMMERCIAL

## 2018-04-21 VITALS
WEIGHT: 115 LBS | OXYGEN SATURATION: 100 % | TEMPERATURE: 98.1 F | SYSTOLIC BLOOD PRESSURE: 129 MMHG | RESPIRATION RATE: 18 BRPM | HEIGHT: 63 IN | DIASTOLIC BLOOD PRESSURE: 77 MMHG | BODY MASS INDEX: 20.38 KG/M2 | HEART RATE: 82 BPM
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
## 2018-04-21 DIAGNOSIS — S16.1XXA STRAIN OF NECK MUSCLE, INITIAL ENCOUNTER: Primary | ICD-10-CM

## 2018-04-21 DIAGNOSIS — N93.8 DUB (DYSFUNCTIONAL UTERINE BLEEDING): ICD-10-CM

## 2018-04-21 LAB — HCG UR QL: NEGATIVE

## 2018-04-21 PROCEDURE — 81025 URINE PREGNANCY TEST: CPT | Performed by: PHYSICIAN ASSISTANT

## 2018-04-21 PROCEDURE — 99283 EMERGENCY DEPT VISIT LOW MDM: CPT

## 2018-04-21 RX ORDER — NAPROXEN 500 MG/1
500 TABLET ORAL 2 TIMES DAILY WITH MEALS
Qty: 20 TAB | Refills: 0 | Status: SHIPPED | OUTPATIENT
Start: 2018-04-21 | End: 2018-05-01
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
## 2018-04-21 NOTE — ED NOTES
Written discharge instructions with 1 prescription given to patient. Discharge home ambulatory in no distress.
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
## 2018-04-21 NOTE — DISCHARGE INSTRUCTIONS
Neck Strain: Care Instructions  Your Care Instructions    You have strained the muscles and ligaments in your neck. A sudden, awkward movement can strain the neck. This often occurs with falls or car accidents or during certain sports. Everyday activities like working on a computer or sleeping can also cause neck strain if they force you to hold your neck in an awkward position for a long time. It is common for neck pain to get worse for a day or two after an injury, but it should start to feel better after that. You may have more pain and stiffness for several days before it gets better. This is expected. It may take a few weeks or longer for it to heal completely. Good home treatment can help you get better faster and avoid future neck problems. Follow-up care is a key part of your treatment and safety. Be sure to make and go to all appointments, and call your doctor if you are having problems. It's also a good idea to know your test results and keep a list of the medicines you take. How can you care for yourself at home? · If you were given a neck brace (cervical collar) to limit neck motion, wear it as instructed for as many days as your doctor tells you to. Do not wear it longer than you were told to. Wearing a brace for too long can make neck stiffness worse and weaken the neck muscles. · You can try using heat or ice to see if it helps. ¨ Try using a heating pad on a low or medium setting for 15 to 20 minutes every 2 to 3 hours. Try a warm shower in place of one session with the heating pad. You can also buy single-use heat wraps that last up to 8 hours. ¨ You can also try an ice pack for 10 to 15 minutes every 2 to 3 hours. · Take pain medicines exactly as directed. ¨ If the doctor gave you a prescription medicine for pain, take it as prescribed. ¨ If you are not taking a prescription pain medicine, ask your doctor if you can take an over-the-counter medicine.   · Gently rub the area to relieve pain and help with blood flow. Do not massage the area if it hurts to do so. · Do not do anything that makes the pain worse. Take it easy for a couple of days. You can do your usual activities if they do not hurt your neck or put it at risk for more stress or injury. · Try sleeping on a special neck pillow. Place it under your neck, not under your head. Placing a tightly rolled-up towel under your neck while you sleep will also work. If you use a neck pillow or rolled towel, do not use your regular pillow at the same time. · To prevent future neck pain, do exercises to stretch and strengthen your neck and back. Learn how to use good posture, safe lifting techniques, and proper body mechanics. When should you call for help? Call 911 anytime you think you may need emergency care. For example, call if:  ? · You are unable to move an arm or a leg at all. ?Call your doctor now or seek immediate medical care if:  ? · You have new or worse symptoms in your arms, legs, chest, belly, or buttocks. Symptoms may include:  ¨ Numbness or tingling. ¨ Weakness. ¨ Pain. ? · You lose bladder or bowel control. ? Watch closely for changes in your health, and be sure to contact your doctor if:  ? · You are not getting better as expected. Where can you learn more? Go to http://odessa-hernandez.info/. Enter M253 in the search box to learn more about \"Neck Strain: Care Instructions. \"  Current as of: March 21, 2017  Content Version: 11.4  © 1339-8117 [employer]. Care instructions adapted under license by [employer] (which disclaims liability or warranty for this information). If you have questions about a medical condition or this instruction, always ask your healthcare professional. [address] any warranty or liability for your use of this information.

## 2018-05-17 ENCOUNTER — HOSPITAL ENCOUNTER (EMERGENCY)
Age: 23
Discharge: HOME | End: 2018-05-17
Attending: EMERGENCY MEDICINE
Payer: COMMERCIAL

## 2018-05-17 VITALS
HEART RATE: 70 BPM | TEMPERATURE: 99 F | HEIGHT: 62 IN | SYSTOLIC BLOOD PRESSURE: 121 MMHG | RESPIRATION RATE: 18 BRPM | OXYGEN SATURATION: 98 % | DIASTOLIC BLOOD PRESSURE: 70 MMHG | WEIGHT: 118.2 LBS | BODY MASS INDEX: 21.75 KG/M2

## 2018-05-17 DIAGNOSIS — B96.89 BV (BACTERIAL VAGINOSIS): Primary | ICD-10-CM

## 2018-05-17 DIAGNOSIS — R10.2 PELVIC PAIN: ICD-10-CM

## 2018-05-17 DIAGNOSIS — N76.0 BV (BACTERIAL VAGINOSIS): Primary | ICD-10-CM

## 2018-05-17 LAB
APPEARANCE UR: CLEAR
BACTERIA URNS QL MICRO: ABNORMAL /HPF
BILIRUB UR QL: NEGATIVE
COLOR UR: YELLOW
EPITH CASTS URNS QL MICRO: ABNORMAL /LPF (ref 0–5)
GLUCOSE UR STRIP.AUTO-MCNC: NEGATIVE MG/DL
HCG UR QL: NEGATIVE
HGB UR QL STRIP: ABNORMAL
KETONES UR QL STRIP.AUTO: NEGATIVE MG/DL
LEUKOCYTE ESTERASE UR QL STRIP.AUTO: ABNORMAL
NITRITE UR QL STRIP.AUTO: NEGATIVE
PH UR STRIP: 6 [PH] (ref 5–8)
PROT UR STRIP-MCNC: NEGATIVE MG/DL
RBC #/AREA URNS HPF: ABNORMAL /HPF (ref 0–5)
SERVICE CMNT-IMP: NORMAL
SP GR UR REFRACTOMETRY: 1.01 (ref 1–1.03)
UROBILINOGEN UR QL STRIP.AUTO: 0.2 EU/DL (ref 0.2–1)
WBC URNS QL MICRO: ABNORMAL /HPF (ref 0–4)
WET PREP GENITAL: NORMAL

## 2018-05-17 PROCEDURE — 81001 URINALYSIS AUTO W/SCOPE: CPT | Performed by: PHYSICIAN ASSISTANT

## 2018-05-17 PROCEDURE — 99284 EMERGENCY DEPT VISIT MOD MDM: CPT

## 2018-05-17 PROCEDURE — 87491 CHLMYD TRACH DNA AMP PROBE: CPT | Performed by: PHYSICIAN ASSISTANT

## 2018-05-17 PROCEDURE — 81025 URINE PREGNANCY TEST: CPT | Performed by: PHYSICIAN ASSISTANT

## 2018-05-17 PROCEDURE — 87210 SMEAR WET MOUNT SALINE/INK: CPT | Performed by: PHYSICIAN ASSISTANT

## 2018-05-17 RX ORDER — METRONIDAZOLE 500 MG/1
500 TABLET ORAL 2 TIMES DAILY
Qty: 14 TAB | Refills: 0 | Status: SHIPPED | OUTPATIENT
Start: 2018-05-17 | End: 2018-05-24

## 2018-05-17 NOTE — ED PROVIDER NOTES
EMERGENCY DEPARTMENT HISTORY AND PHYSICAL EXAM    7:47 PM      Date: 5/17/2018  Patient Name: Cecily Nowak    History of Presenting Illness     Chief Complaint   Patient presents with    Vaginal Discharge         History Provided By: Patient    Chief Complaint: Vaginal Discharge; Pelvic Pain   Duration:  Days  Timing:  Acute  Location: N/A  Quality: N/A  Severity: N/A  Modifying Factors: Did not finish her antibiotics  Associated Symptoms: denies any other associated signs or symptoms      Additional History (Context): Cecily Nowak is a 25 y.o. female presenting to the ED c/o acute onset of vaginal discharge and pelvic pain that started several days ago. Pt states her similar to when she was seen here one month ago and diagnosed with trichomoniasis. States she did not finish her antibiotics because she lost her prescription in her car. Notes LMP was today. Denies fever, body aches, dysuria, and any other symptoms or complaints. PCP: No primary care provider on file. Past History     Past Medical History:  Past Medical History:   Diagnosis Date    Asthma 0    Dysmenorrhea in the adolescent 8/2/2011    Gonorrhea        Past Surgical History:  History reviewed. No pertinent surgical history. Family History:  Family History   Problem Relation Age of Onset    Hypertension Paternal Grandmother     Diabetes Paternal Grandmother     Asthma Paternal Grandmother     Stroke Paternal Grandmother     Asthma Father     Asthma Brother     Asthma Paternal Uncle        Social History:  Social History   Substance Use Topics    Smoking status: Never Smoker    Smokeless tobacco: Never Used    Alcohol use Yes      Comment: occassional use       Allergies:  No Known Allergies      Review of Systems       Review of Systems   Constitutional: Negative for fever. HENT: Negative for congestion and sore throat. Eyes: Negative for redness.    Respiratory: Negative for cough, shortness of breath and wheezing. Gastrointestinal: Negative for abdominal pain and nausea. Genitourinary: Positive for pelvic pain and vaginal discharge. Negative for difficulty urinating and dysuria. Musculoskeletal: Negative for arthralgias, joint swelling, myalgias and neck stiffness. Skin: Negative for pallor and rash. Neurological: Negative for dizziness and headaches. Hematological: Does not bruise/bleed easily. All other systems reviewed and are negative. Physical Exam     Visit Vitals    /73 (BP 1 Location: Left arm, BP Patient Position: At rest)    Pulse 72    Temp 99 °F (37.2 °C)    Resp 16    Ht 5' 2\" (1.575 m)    Wt 53.6 kg (118 lb 3.2 oz)    LMP 05/17/2018    SpO2 99%    Breastfeeding No    BMI 21.62 kg/m2         Physical Exam   Constitutional: She is oriented to person, place, and time. She appears well-developed and well-nourished. No distress. HENT:   Head: Normocephalic and atraumatic. Mouth/Throat: Oropharynx is clear and moist.   Eyes: Conjunctivae are normal. Pupils are equal, round, and reactive to light. No scleral icterus. Neck: Normal range of motion. Neck supple. Cardiovascular: Intact distal pulses. Capillary refill < 3 seconds   Pulmonary/Chest: Effort normal. She has no rales. Abdominal: Soft. Bowel sounds are normal. She exhibits no distension. There is tenderness in the suprapubic area. Musculoskeletal: Normal range of motion. She exhibits no edema. Lymphadenopathy:     She has no cervical adenopathy. Neurological: She is alert and oriented to person, place, and time. No cranial nerve deficit. Skin: Skin is warm and dry. She is not diaphoretic. Nursing note and vitals reviewed.         Diagnostic Study Results     Labs -  Recent Results (from the past 12 hour(s))   URINALYSIS W/ RFLX MICROSCOPIC    Collection Time: 05/17/18  8:07 PM   Result Value Ref Range    Color YELLOW      Appearance CLEAR      Specific gravity 1.010 1.005 - 1.030      pH (UA) 6.0 5.0 - 8.0      Protein NEGATIVE  NEG mg/dL    Glucose NEGATIVE  NEG mg/dL    Ketone NEGATIVE  NEG mg/dL    Bilirubin NEGATIVE  NEG      Blood LARGE (A) NEG      Urobilinogen 0.2 0.2 - 1.0 EU/dL    Nitrites NEGATIVE  NEG      Leukocyte Esterase TRACE (A) NEG     HCG URINE, QL    Collection Time: 05/17/18  8:07 PM   Result Value Ref Range    HCG urine, QL NEGATIVE  NEG     WET PREP    Collection Time: 05/17/18  8:07 PM   Result Value Ref Range    Special Requests: NO SPECIAL REQUESTS      Wet prep MODERATE  CLUE CELLS PRESENT        Wet prep NO YEAST SEEN      Wet prep NO TRICHOMONAS SEEN     URINE MICROSCOPIC ONLY    Collection Time: 05/17/18  8:07 PM   Result Value Ref Range    WBC 0 to 3 0 - 4 /hpf    RBC 4 to 10 0 - 5 /hpf    Epithelial cells 1+ 0 - 5 /lpf    Bacteria 1+ (A) NEG /hpf       Radiologic Studies -   No orders to display         Medical Decision Making   I am the first provider for this patient. I reviewed the vital signs, available nursing notes, past medical history, past surgical history, family history and social history. Vital Signs-Reviewed the patient's vital signs. Pulse Oximetry Analysis -  99% on room air, normal     Records Reviewed: Nursing Notes and Old Medical Records (Time of Review: 7:47 PM)    Provider Notes (Medical Decision Making): ddx gyn infection, UTI    UA, pelvic exam and cultures    MDM    Medications - No data to display    Procedures: Pelvic Exam  Date/Time: 5/17/2018 8:15 PM  Performed by: attending  Procedure duration:  5 minutes. Documented by:  Tre Deluca ED Scribe . As dictated by:  Mariusz Mullins DO  Exam assisted by:  Female RN Real Wesley. Type of exam performed: bimanual and speculum. External genitalia appearance: normal.    Vaginal exam:  bleeding. Post-procedure bleeding: Very scant. Pt on her menses. Could not determine if any discharge. Cervical exam:  normal.    Specimen(s) collected:  chlamydia, GC and vaginal culture.   Bimanual exam: normal.    Patient tolerance: Patient tolerated the procedure well with no immediate complications        ED Course: Progress Notes, Reevaluation, and Consults:  I have reassessed the patient. I have discussed the workup, results and plan with the patient and patient is in agreement. Patient has no new complaint. Patient will be prescribed flagyl. Patient was discharge in stable condition. Patient was given outpatient follow up. Patient is to return to emergency department if any new or worsening condition. Diagnosis     Clinical Impression:   1. BV (bacterial vaginosis)    2. Pelvic pain        Disposition: Discharged     Follow-up Information     Follow up With Details Comments Chirag Sosa MD Call in 2 days  [address] [facility] [address]  [phone] [address] [facility] EMERGENCY DEPT  As needed, If symptoms worsen [address] [place] [address]  [phone]           Patient's Medications   Start Taking    METRONIDAZOLE (FLAGYL) 500 MG TABLET    Take 1 Tab by mouth two (2) times a day for 7 days. Indications: Bacterial Vaginosis   Continue Taking    ALBUTEROL (PROVENTIL HFA, VENTOLIN HFA, PROAIR HFA) 90 MCG/ACTUATION INHALER    Take  by inhalation. These Medications have changed    No medications on file   Stop Taking    No medications on file     _______________________________    Attestations:  Scribe Attestation     Francesco Browning acting as a scribe for and in the presence of Brigitte Floyd DO      May 17, 2018 at 7:47 PM       Provider Attestation:      I personally performed the services described in the documentation, reviewed the documentation, as recorded by the scribe in my presence, and it accurately and completely records my words and actions.  May 17, 2018 at 7:47 PM - Brigitte Floyd DO      _______________________________

## 2018-05-17 NOTE — ED TRIAGE NOTES
Patient presents with c/o vaginal discharge and symptoms similar to last months visit when she was dx with trich. She states that she did not complete medication regimen prescribed for STD tx because she lost the medication. She denies neck pain.

## 2018-05-18 LAB
C TRACH RRNA SPEC QL NAA+PROBE: NEGATIVE
N GONORRHOEA RRNA SPEC QL NAA+PROBE: NEGATIVE
SPECIMEN SOURCE: NORMAL

## 2018-05-18 NOTE — DISCHARGE INSTRUCTIONS
Bacterial Vaginosis: Care Instructions  Your Care Instructions    Bacterial vaginosis is a type of vaginal infection. It is caused by excess growth of certain bacteria that are normally found in the vagina. Symptoms can include itching, swelling, pain when you urinate or have sex, and a gray or yellow discharge with a \"fishy\" odor. It is not considered an infection that is spread through sexual contact. Although symptoms can be annoying and uncomfortable, bacterial vaginosis does not usually cause other health problems. However, if you have it while you are pregnant, it can cause complications. While the infection may go away on its own, most doctors use antibiotics to treat it. You may have been prescribed pills or vaginal cream. With treatment, bacterial vaginosis usually clears up in 5 to 7 days. Follow-up care is a key part of your treatment and safety. Be sure to make and go to all appointments, and call your doctor if you are having problems. It's also a good idea to know your test results and keep a list of the medicines you take. How can you care for yourself at home? · Take your antibiotics as directed. Do not stop taking them just because you feel better. You need to take the full course of antibiotics. · Do not eat or drink anything that contains alcohol if you are taking metronidazole (Flagyl). · Keep using your medicine if you start your period. Use pads instead of tampons while using a vaginal cream or suppository. Tampons can absorb the medicine. · Wear loose cotton clothing. Do not wear nylon and other materials that hold body heat and moisture close to the skin. · Do not scratch. Relieve itching with a cold pack or a cool bath. · Do not wash your vaginal area more than once a day. Use plain water or a mild, unscented soap. Do not douche. When should you call for help?   Watch closely for changes in your health, and be sure to contact your doctor if:  ? · You have unexpected vaginal bleeding. ? · You have a fever. ? · You have new or increased pain in your vagina or pelvis. ? · You are not getting better after 1 week. ? · Your symptoms return after you finish the course of your medicine. Where can you learn more? Go to http://odessa-hernandez.info/. Justice Tannerl in the search box to learn more about \"Bacterial Vaginosis: Care Instructions. \"  Current as of: October 13, 2016  Content Version: 11.4  © 0160-0386 [employer]. Care instructions adapted under license by [employer] (which disclaims liability or warranty for this information). If you have questions about a medical condition or this instruction, always ask your healthcare professional. [address] any warranty or liability for your use of this information. Pelvic Pain: Care Instructions  Your Care Instructions    Pelvic pain, or pain in the lower belly, can have many causes. Often pelvic pain is not serious and gets better in a few days. If your pain continues or gets worse, you may need tests and treatment. Tell your doctor about any new symptoms. These may be signs of a serious problem. Follow-up care is a key part of your treatment and safety. Be sure to make and go to all appointments, and call your doctor if you are having problems. It's also a good idea to know your test results and keep a list of the medicines you take. How can you care for yourself at home? · Rest until you feel better. Lie down, and raise your legs by placing a pillow under your knees. · Drink plenty of fluids. You may find that small, frequent sips are easier on your stomach than if you drink a lot at once. Avoid drinks with carbonation or caffeine, such as soda pop, tea, or coffee. · Try eating several small meals instead of 2 or 3 large ones. Eat mild foods, such as rice, dry toast or crackers, bananas, and applesauce.  Avoid fatty and spicy foods, other fruits, and alcohol until 48 hours after your symptoms have gone away. · Take an over-the-counter pain medicine, such as acetaminophen (Tylenol), ibuprofen (Advil, Motrin), or naproxen (Aleve). Read and follow all instructions on the label. · Do not take two or more pain medicines at the same time unless the doctor told you to. Many pain medicines have acetaminophen, which is Tylenol. Too much acetaminophen (Tylenol) can be harmful. · You can put a heating pad, a warm cloth, or moist heat on your belly to relieve pain. When should you call for help? Call your doctor now or seek immediate medical care if:  · You have a new or higher fever. · You have unusual vaginal bleeding. · You have new or worse belly or pelvic pain. · You have vaginal discharge that has increased in amount or smells bad. Watch closely for changes in your health, and be sure to contact your doctor if:  · You do not get better as expected. Where can you learn more? Go to http://odessa-hernandez.info/. Enter [phone] in the search box to learn more about \"Pelvic Pain: Care Instructions. \"  Current as of: October 13, 2016  Content Version: 11.4  © 3002-6976 [employer]. Care instructions adapted under license by [employer] (which disclaims liability or warranty for this information). If you have questions about a medical condition or this instruction, always ask your healthcare professional. [place] [address] any warranty or liability for your use of this information.

## 2018-05-18 NOTE — ED NOTES
Patient spoke on cell phone during entire discharge discussion. I have reviewed discharge instructions with the patient. The patient verbalized understanding.  Patient armband removed and shredded

## 2019-02-06 ENCOUNTER — HOSPITAL ENCOUNTER (OUTPATIENT)
Age: 24
Discharge: HOME | End: 2019-02-06
Attending: OBSTETRICS & GYNECOLOGY | Admitting: OBSTETRICS & GYNECOLOGY
Payer: COMMERCIAL

## 2019-02-06 VITALS
SYSTOLIC BLOOD PRESSURE: 120 MMHG | DIASTOLIC BLOOD PRESSURE: 68 MMHG | BODY MASS INDEX: 23 KG/M2 | TEMPERATURE: 98.2 F | HEART RATE: 92 BPM | HEIGHT: 62 IN | WEIGHT: 125 LBS

## 2019-02-06 LAB
ALBUMIN SERPL-MCNC: 3 G/DL (ref 3.4–5)
ALBUMIN/GLOB SERPL: 0.8 {RATIO} (ref 0.8–1.7)
ALP SERPL-CCNC: 91 U/L (ref 45–117)
ALT SERPL-CCNC: 16 U/L (ref 13–56)
ANION GAP SERPL CALC-SCNC: 11 MMOL/L (ref 3–18)
APPEARANCE UR: CLEAR
AST SERPL-CCNC: 21 U/L (ref 15–37)
BASOPHILS # BLD: 0 K/UL (ref 0–0.06)
BASOPHILS NFR BLD: 0 % (ref 0–3)
BILIRUB SERPL-MCNC: 0.5 MG/DL (ref 0.2–1)
BILIRUB UR QL: ABNORMAL
BILIRUB UR QL: NEGATIVE
BILIRUB UR QL: NEGATIVE
BLASTS NFR BLD MANUAL: 0 %
BUN SERPL-MCNC: 8 MG/DL (ref 7–18)
BUN/CREAT SERPL: 20 (ref 12–20)
CALCIUM SERPL-MCNC: 8.6 MG/DL (ref 8.5–10.1)
CHLORIDE SERPL-SCNC: 105 MMOL/L (ref 100–108)
CO2 SERPL-SCNC: 20 MMOL/L (ref 21–32)
COLOR UR: ABNORMAL
COLOR UR: YELLOW
COLOR UR: YELLOW
CREAT SERPL-MCNC: 0.41 MG/DL (ref 0.6–1.3)
DIFFERENTIAL METHOD BLD: ABNORMAL
EOSINOPHIL # BLD: 0.1 K/UL (ref 0–0.4)
EOSINOPHIL NFR BLD: 1 % (ref 0–5)
ERYTHROCYTE [DISTWIDTH] IN BLOOD BY AUTOMATED COUNT: 13.2 % (ref 11.6–14.5)
GLOBULIN SER CALC-MCNC: 3.9 G/DL (ref 2–4)
GLUCOSE SERPL-MCNC: 94 MG/DL (ref 74–99)
GLUCOSE UR QL STRIP.AUTO: 500 MG/DL
GLUCOSE UR QL STRIP.AUTO: 500 MG/DL
GLUCOSE UR QL STRIP.AUTO: NEGATIVE MG/DL
HCT VFR BLD AUTO: 31.5 % (ref 35–45)
HGB BLD-MCNC: 10.9 G/DL (ref 12–16)
KETONES UR-MCNC: 80 MG/DL
KETONES UR-MCNC: 80 MG/DL
KETONES UR-MCNC: NEGATIVE MG/DL
LEUKOCYTE ESTERASE UR QL STRIP: ABNORMAL
LEUKOCYTE ESTERASE UR QL STRIP: ABNORMAL
LEUKOCYTE ESTERASE UR QL STRIP: NEGATIVE
LYMPHOCYTES # BLD: 0.5 K/UL (ref 0.8–3.5)
LYMPHOCYTES NFR BLD: 7 % (ref 20–51)
MANUAL DIFFERENTIAL PERFORMED BLD QL: ABNORMAL
MCH RBC QN AUTO: 30.1 PG (ref 24–34)
MCHC RBC AUTO-ENTMCNC: 34.6 G/DL (ref 31–37)
MCV RBC AUTO: 87 FL (ref 74–97)
METAMYELOCYTES NFR BLD MANUAL: 0 %
MONOCYTES # BLD: 0.2 K/UL (ref 0–1)
MONOCYTES NFR BLD: 3 % (ref 2–9)
MYELOCYTES NFR BLD MANUAL: 0 %
NEUTS BAND NFR BLD MANUAL: 5 % (ref 0–5)
NEUTS SEG # BLD: 6.6 K/UL (ref 1.8–8)
NEUTS SEG NFR BLD: 84 % (ref 42–75)
NITRITE UR QL: NEGATIVE
OTHER CELLS NFR BLD MANUAL: 0 %
PH UR: 6 [PH] (ref 5–9)
PLATELET # BLD AUTO: 109 K/UL (ref 135–420)
PLATELET COMMENTS,PCOM: ABNORMAL
PMV BLD AUTO: 12.3 FL (ref 9.2–11.8)
POTASSIUM SERPL-SCNC: 3.5 MMOL/L (ref 3.5–5.5)
PROMYELOCYTES NFR BLD MANUAL: 0 %
PROT SERPL-MCNC: 6.9 G/DL (ref 6.4–8.2)
PROT UR QL: 30 MG/DL
PROT UR QL: NEGATIVE MG/DL
PROT UR QL: NEGATIVE MG/DL
RBC # BLD AUTO: 3.62 M/UL (ref 4.2–5.3)
RBC # UR STRIP: NEGATIVE /UL
RBC MORPH BLD: ABNORMAL
SERVICE CMNT-IMP: ABNORMAL
SODIUM SERPL-SCNC: 136 MMOL/L (ref 136–145)
SP GR UR: 1.01 (ref 1–1.02)
SP GR UR: 1.01 (ref 1–1.02)
SP GR UR: >1.03 (ref 1–1.02)
UROBILINOGEN UR QL: 0.2 EU/DL (ref 0.2–1)
WBC # BLD AUTO: 7.4 K/UL (ref 4.6–13.2)

## 2019-02-06 PROCEDURE — 96361 HYDRATE IV INFUSION ADD-ON: CPT

## 2019-02-06 PROCEDURE — 59025 FETAL NON-STRESS TEST: CPT

## 2019-02-06 PROCEDURE — 81003 URINALYSIS AUTO W/O SCOPE: CPT

## 2019-02-06 PROCEDURE — 74011250636 HC RX REV CODE- 250/636: Performed by: OBSTETRICS & GYNECOLOGY

## 2019-02-06 PROCEDURE — 85027 COMPLETE CBC AUTOMATED: CPT

## 2019-02-06 PROCEDURE — 80053 COMPREHEN METABOLIC PANEL: CPT

## 2019-02-06 PROCEDURE — 99283 EMERGENCY DEPT VISIT LOW MDM: CPT

## 2019-02-06 PROCEDURE — 96360 HYDRATION IV INFUSION INIT: CPT

## 2019-02-06 PROCEDURE — 74011250637 HC RX REV CODE- 250/637: Performed by: OBSTETRICS & GYNECOLOGY

## 2019-02-06 RX ORDER — DEXTROSE, SODIUM CHLORIDE, SODIUM LACTATE, POTASSIUM CHLORIDE, AND CALCIUM CHLORIDE 5; .6; .31; .03; .02 G/100ML; G/100ML; G/100ML; G/100ML; G/100ML
1000 INJECTION, SOLUTION INTRAVENOUS ONCE
Status: COMPLETED | OUTPATIENT
Start: 2019-02-06 | End: 2019-02-06

## 2019-02-06 RX ORDER — ACETAMINOPHEN 500 MG
1000 TABLET ORAL ONCE
Status: COMPLETED | OUTPATIENT
Start: 2019-02-06 | End: 2019-02-06

## 2019-02-06 RX ADMIN — SODIUM CHLORIDE, SODIUM LACTATE, POTASSIUM CHLORIDE, CALCIUM CHLORIDE, AND DEXTROSE MONOHYDRATE 1000 ML: 600; 310; 30; 20; 5 INJECTION, SOLUTION INTRAVENOUS at 13:33

## 2019-02-06 RX ADMIN — ACETAMINOPHEN 1000 MG: 500 TABLET ORAL at 15:39

## 2019-02-06 NOTE — DISCHARGE INSTRUCTIONS
Patient Education        Learning About Pregnancy  Your Care Instructions    Your health in the early weeks of your pregnancy is particularly important for your baby's health. Take good care of yourself. Anything you do that harms your body can also harm your baby. Make sure to go to all of your doctor appointments. Regular checkups will help keep you and your baby healthy. How can you care for yourself at home? Diet    · Eat a balanced diet. Make sure your diet includes plenty of beans, peas, and leafy green vegetables.     · Do not skip meals or go for many hours without eating. If you are nauseated, try to eat a small, healthy snack every 2 to 3 hours.     · Do not eat fish that has a high level of mercury, such as shark, swordfish, or mackerel. Do not eat more than one can of tuna each week.     · Drink plenty of fluids, enough so that your urine is light yellow or clear like water. If you have kidney, heart, or liver disease and have to limit fluids, talk with your doctor before you increase the amount of fluids you drink.     · Cut down on caffeine, such as coffee, tea, and cola.     · Do not drink alcohol, such as beer, wine, or hard liquor.     · Take a multivitamin that contains at least 400 micrograms (mcg) of folic acid to help prevent birth defects. Fortified cereal and whole wheat bread are good additional sources of folic acid.     · Increase the calcium in your diet. Try to drink a quart of skim milk each day. You may also take calcium supplements and choose foods such as cheese and yogurt.    Lifestyle    · Make sure you go to your follow-up appointments.     · Get plenty of rest. You may be unusually tired while you are pregnant.     · Get at least 30 minutes of exercise on most days of the week. Walking is a good choice. If you have not exercised in the past, start out slowly. Take several short walks each day.     · Do not smoke.  If you need help quitting, talk to your doctor about stop-smoking programs. These can increase your chances of quitting for good.     · Do not touch cat feces or litter boxes. Also, wash your hands after you handle raw meat, and fully cook all meat before you eat it. Wear gloves when you work in the yard or garden, and wash your hands well when you are done. Cat feces, raw or undercooked meat, and contaminated dirt can cause an infection that may harm your baby or lead to a miscarriage.     · Do not use saunas or hot tubs. Raising your body temperature may harm your baby.     · Avoid chemical fumes, paint fumes, or poisons.     · Do not use illegal drugs or alcohol. Medicines    · Review all of your medicines with your doctor. Some of your routine medicines may need to be changed to protect your baby.     · Use acetaminophen (Tylenol) to relieve minor problems, such as a mild headache or backache or a mild fever with cold symptoms. Do not use nonsteroidal anti-inflammatory drugs (NSAIDs), such as ibuprofen (Advil, Motrin) or naproxen (Aleve), unless your doctor says it is okay.     · Do not take two or more pain medicines at the same time unless the doctor told you to. Many pain medicines have acetaminophen, which is Tylenol. Too much acetaminophen (Tylenol) can be harmful.     · Take your medicines exactly as prescribed. Call your doctor if you think you are having a problem with your medicine.    To manage morning sickness    · If you feel sick when you first wake up, try eating a small snack (such as crackers) before you get out of bed. Allow some time to digest the snack, and then get out of bed slowly.     · Do not skip meals or go for long periods without eating. An empty stomach can make nausea worse.     · Eat small, frequent meals instead of three large meals each day.     · Drink plenty of fluids.  Sports drinks, such as Gatorade or Powerade, are good choices.     · Eat foods that are high in protein but low in fat.     · If you are taking iron supplements, ask your doctor if they are necessary. Iron can make nausea worse.     · Avoid any smells, such as coffee, that make you feel sick.     · Get lots of rest. Morning sickness may be worse when you are tired. Follow-up care is a key part of your treatment and safety. Be sure to make and go to all appointments, and call your doctor if you are having problems. It's also a good idea to know your test results and keep a list of the medicines you take. Where can you learn more? Go to http://odessa-hernandez.info/. Enter J922 in the search box to learn more about \"Learning About Pregnancy. \"  Current as of: September 5, 2018  Content Version: 11.9  © 7701-2222 [employer]. Care instructions adapted under license by [employer] (which disclaims liability or warranty for this information). If you have questions about a medical condition or this instruction, always ask your healthcare professional. [address] any warranty or liability for your use of this information. Patient Education        Belly Pain in Pregnancy: Care Instructions  Your Care Instructions    When you're pregnant, any belly pain can be a worry. You may not want to call your doctor about every pain you have. But you don't want to miss something that is dangerous for you or your baby. Even if it feels familiar, belly pain can mean something new when you're pregnant. It's important to know when to call your doctor. It will also help to know how to care for yourself at home when your pain is not caused by anything harmful. · When belly pain is more severe or constant, see a doctor right away. · If you're sure your belly pain is a sign of labor, call your doctor. · When belly pain is brief, it's usually a normal part of pregnancy. It might be related to changes in the growing uterus. Or it could be the stretching of ligaments called round ligaments. These ligaments help support the uterus. Round ligament pain can be on either side of your belly. It can also be felt in your hips or groin. Follow-up care is a key part of your treatment and safety. Be sure to make and go to all appointments, and call your doctor if you are having problems. It's also a good idea to know your test results and keep a list of the medicines you take. How can you tell if belly pain is a sign of labor? When belly pain is caused by labor, it can feel like mild or menstrual-like cramps in your lower belly. These cramps are probably contractions. They can happen in your second or third trimester. You may also have:  · A steady, dull ache in your lower back, pelvis, or thighs. · A feeling of pressure in your pelvis or lower belly. · Changes in your vaginal discharge or a sudden release of fluid from the vagina. If you think you are in labor, call your doctor. How can you care for yourself at home? When belly pain is mild and is not a symptom of labor:  · Rest until you feel better. · Take a warm bath. · Think about what you drink and eat:  ? Drink plenty of fluids. Choose water and other caffeine-free clear liquids until you feel better. ? Try eating small, frequent meals. If your stomach is upset, try bland, low-fat foods like plain rice, broiled chicken, toast, and yogurt. · Think about how you move if you are having brief pains from stretching of the round ligaments. ? Try gentle stretching. ? Move a little more slowly when turning in bed or getting up from a chair, so those ligaments don't stretch quickly. ? Lean forward a bit if you think you are going to cough or sneeze. When should you call for help? Call 911 anytime you think you may need emergency care.  For example, call if:    · You have sudden, severe pain in your belly.     · You have severe vaginal bleeding.    Call your doctor now or seek immediate medical care if:    · You have new or worse belly pain or cramping.     · You have any vaginal bleeding.     · You have a fever.     · You have symptoms of preeclampsia, such as:  ? Sudden swelling of your face, hands, or feet. ? New vision problems (such as dimness or blurring). ? A severe headache.     · You think that you may be in labor. This means that you've had at least 8 contractions within 1 hour or at least 4 contractions within 20 minutes, even after you change your position and drink fluids.     · You have symptoms of a urinary tract infection. These may include:  ? Pain or burning when you urinate. ? A frequent need to urinate without being able to pass much urine. ? Pain in the flank, which is just below the rib cage and above the waist on either side of the back. ? Blood in your urine.    Watch closely for changes in your health, and be sure to contact your doctor if you are worried about your or your baby's health. Where can you learn more? Go to http://odessa-hernandez.info/. Enter 183 173 [id number] in the search box to learn more about \"Belly Pain in Pregnancy: Care Instructions. \"  Current as of: September 5, 2018  Content Version: 11.9  © 7565-8928 [employer]. Care instructions adapted under license by [employer] (which disclaims liability or warranty for this information). If you have questions about a medical condition or this instruction, always ask your healthcare professional. [address] any warranty or liability for your use of this information.

## 2019-02-06 NOTE — PROGRESS NOTES
200- Pt is a 21 y.o.  at 25w1d per patient , arrived by ambulation, for nausea, vomiting and back pain since this morning. She states she has experienced n/v since 0500 this am, the back pain for the past two hours. She denies ctxs, denies VB, denies LOF, positive  FM. SHe also states she has been seeing white discharge for the past month, no odor associated with discharge. [facility] visit was [facility] which included an ultrasound to follow up on \"the placenta situation\". Irene and EFM placed. 12- Dr. Marlon Alvarez at bedside. New verbal orders given. 1400- Patient given saltine cracker and tolerated well. 1610- Verbal and written discharge instructions given to patient. Time for questions and answers. Patient ambulated off the unit with no acute distress noted.

## 2019-02-06 NOTE — H&P
History & Physical    Name: Stephanie Kaur MRN: [id number]  SSN: [id number]    YOB: 1995  Age: 21 y.o. Sex: female        Subjective:     Estimated Date of Delivery: 19  OB History      Para Term  AB Living    4 1 1 0 1 1    SAB TAB Ectopic Molar Multiple Live Births    0 1 0   0 1        Obstetric Comments        Periods regular, last 4 days, flow medium with clots, moderate dysmenorrhea  History of sexually transmitted infections chlamydia             Ms. Golden Bah presents for evaluation of pregnancy at 25w1d for nausea/vomiting and diarrhea. Associated with abdominal cramps and low back pain which brought her to L&D. She states she ate a chicken sandwich 4 hours prior to her symptoms. She denies sick contacts. She denies ctx, lof, vb +fm. Prenatal course was complicated by placenta previa on morphology scan, now resolved per pt. She goes to [facility] for her prenatal care. Please see prenatal records for details. Past Medical History:   Diagnosis Date    Asthma 1998    Dysmenorrhea in the adolescent 2011    Gonorrhea      No past surgical history on file. No Known Allergies  Prior to Admission medications    Medication Sig Start Date End Date Taking? Authorizing Provider   albuterol (PROVENTIL HFA, VENTOLIN HFA, PROAIR HFA) 90 mcg/actuation inhaler Take  by inhalation.     Other, MD Monae      Social History     Occupational History    Not on file   Tobacco Use    Smoking status: Never Smoker    Smokeless tobacco: Never Used   Substance and Sexual Activity    Alcohol use: Yes     Comment: occassional use    Drug use: No     Comment: denies    Sexual activity: Yes     Partners: Male     Birth control/protection: Condom     Family History   Problem Relation Age of Onset    Hypertension Paternal Grandmother     Diabetes Paternal Grandmother     Asthma Paternal Grandmother     Stroke Paternal Grandmother     Asthma Father     Asthma Brother     Asthma Paternal Uncle        Review of Systems: A comprehensive review of systems was negative except for that written in the HPI. Objective:     Vitals:  Vitals:    02/06/19 1230 02/06/19 1233   BP: 120/68    Pulse: 92    Temp: 98.2 °F (36.8 °C)    Weight:  125 lb (56.7 kg)   Height:  5' 2\" (1.575 m)        Physical Exam:  Patient without distress.   Heart: Regular rate and rhythm, S1S2 present or without murmur or extra heart sounds  Lung: clear to auscultation throughout lung fields, no wheezes, no rales, no rhonchi and normal respiratory effort  Abdomen: soft, nontender  Fundus: soft and non tender  Lower Extremities: no calf tenderness  Membranes:  Intact  Fetal Heart Rate: Reactive  Baseline: 155 per minute  Variability: moderate  Accelerations: yes  Decelerations: none  Uterine contractions: none    Prenatal Labs:   Lab Results   Component Value Date/Time    Rubella, External Immune 05/06/2015    GrBStrep, External Neg 11/04/2015    HBsAg, External Neg 05/06/2015    HIV, External NR 05/06/2015    RPR, External NR 05/06/2015          Recent Results (from the past 24 hour(s))   POC URINE MACROSCOPIC    Collection Time: 02/06/19 12:40 PM   Result Value Ref Range    Color MIGUEL      Appearance CLEAR      Spec. gravity (POC) >1.030 (H) 1.001 - 1.023    pH, urine  (POC) 6.0 5.0 - 9.0      Protein (POC) 30 (A) NEG mg/dL    Glucose, urine (POC) NEGATIVE  NEG mg/dL    Ketones (POC) 80 (A) NEG mg/dL    Bilirubin (POC) SMALL (A) NEG      Blood (POC) NEGATIVE  NEG      Urobilinogen (POC) 0.2 0.2 - 1.0 EU/dL    Nitrite (POC) NEGATIVE  NEG      Leukocyte esterase (POC) SMALL (A) NEG      Performed by Yamil Rahman    CBC WITH MANUAL DIFF    Collection Time: 02/06/19  1:30 PM   Result Value Ref Range    WBC PENDING K/uL    RBC PENDING M/uL    HGB PENDING g/dL    HCT PENDING %    [facility] PENDING FL    [facility] PENDING PG    MCHC PENDING g/dL    RDW PENDING %    PLATELET PENDING K/uL    MPV PENDING FL    NEUTROPHILS PENDING % LYMPHOCYTES PENDING %    MONOCYTES PENDING %    EOSINOPHILS PENDING %    BASOPHILS PENDING %    BAND NEUTROPHILS PENDING %    PROMYELOCYTES PENDING %    MYELOCYTES PENDING %    METAMYELOCYTES PENDING %    BLASTS PENDING %    OTHER CELL PENDING     ABS. NEUTROPHILS PENDING K/UL    ABS. LYMPHOCYTES PENDING K/UL    ABS. MONOCYTES PENDING K/UL    ABS. EOSINOPHILS PENDING K/UL    ABS. BASOPHILS PENDING K/UL    RBC COMMENTS PENDING     DF PENDING     DIFFERENTIAL MANUAL DIFFERENTIAL ORDERED         Assessment/Plan:     Patient Active Problem List   Diagnosis Code    Dysmenorrhea in the adolescent N94.6    Vaginitis due to Gardnerella vaginalis N76.0, B96.89    Family planning Z30.09    Pregnancy Z34.90       Plan: 25w1d   No evidence of labor   Reassuring fetal status   N/V/D: resolved.  Likely viral GI, will check electrolytes, CBC  +ketones on udip-->will give IVF   Abdominal cramps: tylenol prn,   Ok D/C home if pt feeling better and labs normal   F/u 7 days with primary OB (per pt has appt next week for glucose)    Signed By:  Radha Osman MD     February 6, 2019 2:34 PM

## 2019-04-10 ENCOUNTER — HOSPITAL ENCOUNTER (EMERGENCY)
Age: 24
Discharge: LEFT BEFORE DISCHARGE | End: 2019-04-10
Attending: EMERGENCY MEDICINE
Payer: MEDICAID

## 2019-04-10 VITALS
SYSTOLIC BLOOD PRESSURE: 118 MMHG | OXYGEN SATURATION: 98 % | BODY MASS INDEX: 23.78 KG/M2 | DIASTOLIC BLOOD PRESSURE: 70 MMHG | HEART RATE: 89 BPM | RESPIRATION RATE: 16 BRPM | WEIGHT: 130 LBS | TEMPERATURE: 98.4 F

## 2019-04-10 PROCEDURE — 75810000275 HC EMERGENCY DEPT VISIT NO LEVEL OF CARE

## 2019-04-11 ENCOUNTER — HOSPITAL ENCOUNTER (EMERGENCY)
Age: 24
Discharge: HOME | End: 2019-04-11
Attending: EMERGENCY MEDICINE
Payer: COMMERCIAL

## 2019-04-11 VITALS
BODY MASS INDEX: 23.92 KG/M2 | HEART RATE: 95 BPM | TEMPERATURE: 98.3 F | RESPIRATION RATE: 18 BRPM | DIASTOLIC BLOOD PRESSURE: 68 MMHG | HEIGHT: 62 IN | WEIGHT: 130 LBS | SYSTOLIC BLOOD PRESSURE: 107 MMHG | OXYGEN SATURATION: 99 %

## 2019-04-11 DIAGNOSIS — Z3A.34 34 WEEKS GESTATION OF PREGNANCY: ICD-10-CM

## 2019-04-11 DIAGNOSIS — N30.00 ACUTE CYSTITIS WITHOUT HEMATURIA: Primary | ICD-10-CM

## 2019-04-11 DIAGNOSIS — K21.00 GASTROESOPHAGEAL REFLUX DISEASE WITH ESOPHAGITIS: ICD-10-CM

## 2019-04-11 LAB
ANION GAP SERPL CALC-SCNC: 10 MMOL/L (ref 3–18)
APPEARANCE UR: ABNORMAL
BACTERIA URNS QL MICRO: ABNORMAL /HPF
BASOPHILS # BLD: 0 K/UL (ref 0–0.1)
BASOPHILS NFR BLD: 0 % (ref 0–2)
BILIRUB UR QL: NEGATIVE
BUN SERPL-MCNC: 10 MG/DL (ref 7–18)
BUN/CREAT SERPL: 21 (ref 12–20)
CALCIUM SERPL-MCNC: 8.6 MG/DL (ref 8.5–10.1)
CHLORIDE SERPL-SCNC: 106 MMOL/L (ref 100–108)
CO2 SERPL-SCNC: 22 MMOL/L (ref 21–32)
COLOR UR: YELLOW
CREAT SERPL-MCNC: 0.48 MG/DL (ref 0.6–1.3)
DIFFERENTIAL METHOD BLD: ABNORMAL
EOSINOPHIL # BLD: 0.2 K/UL (ref 0–0.4)
EOSINOPHIL NFR BLD: 1 % (ref 0–5)
EPITH CASTS URNS QL MICRO: ABNORMAL /LPF (ref 0–5)
ERYTHROCYTE [DISTWIDTH] IN BLOOD BY AUTOMATED COUNT: 14.2 % (ref 11.6–14.5)
GLUCOSE SERPL-MCNC: 113 MG/DL (ref 74–99)
GLUCOSE UR STRIP.AUTO-MCNC: NEGATIVE MG/DL
HCT VFR BLD AUTO: 32.4 % (ref 35–45)
HGB BLD-MCNC: 10.4 G/DL (ref 12–16)
HGB UR QL STRIP: NEGATIVE
KETONES UR QL STRIP.AUTO: >160 MG/DL
LEUKOCYTE ESTERASE UR QL STRIP.AUTO: ABNORMAL
LYMPHOCYTES # BLD: 2 K/UL (ref 0.9–3.6)
LYMPHOCYTES NFR BLD: 17 % (ref 21–52)
MCH RBC QN AUTO: 29.1 PG (ref 24–34)
MCHC RBC AUTO-ENTMCNC: 32.1 G/DL (ref 31–37)
MCV RBC AUTO: 90.8 FL (ref 74–97)
MONOCYTES # BLD: 0.6 K/UL (ref 0.05–1.2)
MONOCYTES NFR BLD: 5 % (ref 3–10)
NEUTS SEG # BLD: 8.7 K/UL (ref 1.8–8)
NEUTS SEG NFR BLD: 77 % (ref 40–73)
NITRITE UR QL STRIP.AUTO: NEGATIVE
PH UR STRIP: 5.5 [PH] (ref 5–8)
PLATELET # BLD AUTO: 111 K/UL (ref 135–420)
PMV BLD AUTO: 12.4 FL (ref 9.2–11.8)
POTASSIUM SERPL-SCNC: 3.6 MMOL/L (ref 3.5–5.5)
PROT UR STRIP-MCNC: ABNORMAL MG/DL
RBC # BLD AUTO: 3.57 M/UL (ref 4.2–5.3)
RBC #/AREA URNS HPF: ABNORMAL /HPF (ref 0–5)
SODIUM SERPL-SCNC: 138 MMOL/L (ref 136–145)
SP GR UR REFRACTOMETRY: >1.03 (ref 1–1.03)
UROBILINOGEN UR QL STRIP.AUTO: 1 EU/DL (ref 0.2–1)
WBC # BLD AUTO: 11.4 K/UL (ref 4.6–13.2)
WBC URNS QL MICRO: ABNORMAL /HPF (ref 0–4)

## 2019-04-11 PROCEDURE — 81001 URINALYSIS AUTO W/SCOPE: CPT

## 2019-04-11 PROCEDURE — 74011250637 HC RX REV CODE- 250/637: Performed by: EMERGENCY MEDICINE

## 2019-04-11 PROCEDURE — 85025 COMPLETE CBC W/AUTO DIFF WBC: CPT

## 2019-04-11 PROCEDURE — 80048 BASIC METABOLIC PNL TOTAL CA: CPT

## 2019-04-11 PROCEDURE — 99283 EMERGENCY DEPT VISIT LOW MDM: CPT

## 2019-04-11 RX ORDER — GLUCOSAMINE SULFATE 1500 MG
POWDER IN PACKET (EA) ORAL DAILY
COMMUNITY
End: 2021-05-03

## 2019-04-11 RX ORDER — AMOXICILLIN 500 MG/1
500 TABLET, FILM COATED ORAL 3 TIMES DAILY
Qty: 30 TAB | Refills: 0 | Status: ON HOLD | OUTPATIENT
Start: 2019-04-11 | End: 2019-04-22 | Stop reason: CLARIF

## 2019-04-11 RX ORDER — FAMOTIDINE 20 MG/1
20 TABLET, FILM COATED ORAL 2 TIMES DAILY
Qty: 10 TAB | Refills: 0 | Status: SHIPPED | OUTPATIENT
Start: 2019-04-11 | End: 2019-04-16

## 2019-04-11 RX ORDER — AMOXICILLIN 250 MG/1
500 CAPSULE ORAL EVERY 8 HOURS
Status: DISCONTINUED | OUTPATIENT
Start: 2019-04-11 | End: 2019-04-12 | Stop reason: HOSPADM

## 2019-04-11 RX ADMIN — AMOXICILLIN 500 MG: 250 CAPSULE ORAL at 21:59

## 2019-04-11 NOTE — CALL BACK NOTE
Pt states she called and spoke to the doctor the am. States she is still concerned and plans to return here today for an evaluations

## 2019-04-11 NOTE — ED TRIAGE NOTES
Patient reports to ED for vomiting x1 today at 1100 and noting blood in it. Patient denies any other vomiting, denies, abdominal pain, denies any other complaints.

## 2019-04-12 NOTE — DISCHARGE INSTRUCTIONS
Patient Education        Gastroesophageal Reflux Disease (GERD): Care Instructions  Your Care Instructions    Gastroesophageal reflux disease (GERD) is the backward flow of stomach acid into the esophagus. The esophagus is the tube that leads from your throat to your stomach. A one-way valve prevents the stomach acid from moving up into this tube. When you have GERD, this valve does not close tightly enough. If you have mild GERD symptoms including heartburn, you may be able to control the problem with antacids or over-the-counter medicine. Changing your diet, losing weight, and making other lifestyle changes can also help reduce symptoms. Follow-up care is a key part of your treatment and safety. Be sure to make and go to all appointments, and call your doctor if you are having problems. It's also a good idea to know your test results and keep a list of the medicines you take. How can you care for yourself at home? · Take your medicines exactly as prescribed. Call your doctor if you think you are having a problem with your medicine. · Your doctor may recommend over-the-counter medicine. For mild or occasional indigestion, antacids, such as Tums, Gaviscon, Mylanta, or Maalox, may help. Your doctor also may recommend over-the-counter acid reducers, such as Pepcid AC, Tagamet HB, Zantac 75, or Prilosec. Read and follow all instructions on the label. If you use these medicines often, talk with your doctor. · Change your eating habits. ? It's best to eat several small meals instead of two or three large meals. ? After you eat, wait 2 to 3 hours before you lie down. ? Chocolate, mint, and alcohol can make GERD worse. ? Spicy foods, foods that have a lot of acid (like tomatoes and oranges), and coffee can make GERD symptoms worse in some people. If your symptoms are worse after you eat a certain food, you may want to stop eating that food to see if your symptoms get better.   · Do not smoke or chew tobacco. Smoking can make GERD worse. If you need help quitting, talk to your doctor about stop-smoking programs and medicines. These can increase your chances of quitting for good. · If you have GERD symptoms at night, raise the head of your bed 6 to 8 inches by putting the frame on blocks or placing a foam wedge under the head of your mattress. (Adding extra pillows does not work.)  · Do not wear tight clothing around your middle. · Lose weight if you need to. Losing just 5 to 10 pounds can help. When should you call for help? Call your doctor now or seek immediate medical care if:    · You have new or different belly pain.     · Your stools are black and tarlike or have streaks of blood.    Watch closely for changes in your health, and be sure to contact your doctor if:    · Your symptoms have not improved after 2 days.     · Food seems to catch in your throat or chest.   Where can you learn more? Go to http://odessaThe Gifts Projecthernandez.info/. Enter E535 in the search box to learn more about \"Gastroesophageal Reflux Disease (GERD): Care Instructions. \"  Current as of: March 27, 2018  Content Version: 11.9  © 7188-1934 [employer]. Care instructions adapted under license by [employer] (which disclaims liability or warranty for this information). If you have questions about a medical condition or this instruction, always ask your healthcare professional. [place] [address] any warranty or liability for your use of this information. Patient Education        Urinary Tract Infection in Women: Care Instructions  Your Care Instructions    A urinary tract infection, or UTI, is a general term for an infection anywhere between the kidneys and the urethra (where urine comes out). Most UTIs are bladder infections. They often cause pain or burning when you urinate. UTIs are caused by bacteria and can be cured with antibiotics.  Be sure to complete your treatment so that the infection goes away. Follow-up care is a key part of your treatment and safety. Be sure to make and go to all appointments, and call your doctor if you are having problems. It's also a good idea to know your test results and keep a list of the medicines you take. How can you care for yourself at home? · Take your antibiotics as directed. Do not stop taking them just because you feel better. You need to take the full course of antibiotics. · Drink extra water and other fluids for the next day or two. This may help wash out the bacteria that are causing the infection. (If you have kidney, heart, or liver disease and have to limit fluids, talk with your doctor before you increase your fluid intake.)  · Avoid drinks that are carbonated or have caffeine. They can irritate the bladder. · Urinate often. Try to empty your bladder each time. · To relieve pain, take a hot bath or lay a heating pad set on low over your lower belly or genital area. Never go to sleep with a heating pad in place. To prevent UTIs  · Drink plenty of water each day. This helps you urinate often, which clears bacteria from your system. (If you have kidney, heart, or liver disease and have to limit fluids, talk with your doctor before you increase your fluid intake.)  · Urinate when you need to. · Urinate right after you have sex. · Change sanitary pads often. · Avoid douches, bubble baths, feminine hygiene sprays, and other feminine hygiene products that have deodorants. · After going to the bathroom, wipe from front to back. When should you call for help? Call your doctor now or seek immediate medical care if:    · Symptoms such as fever, chills, nausea, or vomiting get worse or appear for the first time.     · You have new pain in your back just below your rib cage.  This is called flank pain.     · There is new blood or pus in your urine.     · You have any problems with your antibiotic medicine.    Watch closely for changes in your health, and be sure to contact your doctor if:    · You are not getting better after taking an antibiotic for 2 days.     · Your symptoms go away but then come back. Where can you learn more? Go to http://odessa-hernandez.info/. Enter M440 in the search box to learn more about \"Urinary Tract Infection in Women: Care Instructions. \"  Current as of: 2018  Content Version: 11.9  © 7054-2405 [employer]. Care instructions adapted under license by [employer] (which disclaims liability or warranty for this information). If you have questions about a medical condition or this instruction, always ask your healthcare professional. [place] [address] any warranty or liability for your use of this information. Patient Education        Weeks 34 to 39 of Your Pregnancy: Care Instructions  Your Care Instructions    By now, your baby and your belly have grown quite large. It is almost time to give birth. A full-term pregnancy can deliver between 37 and 42 weeks. Your baby's lungs are almost ready to breathe air. The bones in your baby's head are now firm enough to protect it, but soft enough to move down through the birth canal.  You may feel excited, happy, anxious, or scared. You may wonder how you will know if you are in labor or what to expect during labor. Try to be flexible in your expectations of the birth. Because each birth is different, there is no way to know exactly what childbirth will be like for you. This care sheet will help you know what to expect and how to prepare. This may make your childbirth easier. If you haven't already had the Tdap shot during this pregnancy, talk to your doctor about getting it. It will help protect your  against pertussis infection. In the 36th week, most women have a test for group B streptococcus (GBS). GBS is a common bacteria that can live in the vagina and rectum.  It can make your baby sick after birth. If you test positive, you will get antibiotics during labor. The medicine will keep your baby from getting the bacteria. Follow-up care is a key part of your treatment and safety. Be sure to make and go to all appointments, and call your doctor if you are having problems. It's also a good idea to know your test results and keep a list of the medicines you take. How can you care for yourself at home? Learn about pain relief choices  · Pain is different for every woman. Talk with your doctor about your feelings about pain. · You can choose from several types of pain relief. These include medicine or breathing techniques, as well as comfort measures. You can use more than one option. · If you choose to have pain medicine during labor, talk to your doctor about your options. Some medicines lower anxiety and help with some of the pain. Others make your lower body numb so that you won't feel pain. · Be sure to tell your doctor about your pain medicine choice before you start labor or very early in your labor. You may be able to change your mind as labor progresses. · Rarely, a woman is put to sleep by medicine given through a mask or an IV. Labor and delivery  · The first stage of labor has three parts: early, active, and transition. ? Most women have early labor at home. You can stay busy or rest, eat light snacks, drink clear fluids, and start counting contractions. ? When talking during a contraction gets hard, you may be moving to active labor. During active labor, you should head for the hospital if you are not there already. ? You are in active labor when contractions come every 3 to 4 minutes and last about 60 seconds. Your cervix is opening more rapidly. ? If your water breaks, contractions will come faster and stronger. ? During transition, your cervix is stretching, and contractions are coming more rapidly. ? You may want to push, but your cervix might not be ready.  Your doctor will tell you when to push. · The second stage starts when your cervix is completely opened and you are ready to push. ? Contractions are very strong to push the baby down the birth canal.  ? You will feel the urge to push. You may feel like you need to have a bowel movement. ? You may be coached to push with contractions. These contractions will be very strong, but you will not have them as often. You can get a little rest between contractions. ? You may be emotional and irritable. You may not be aware of what is going on around you.  ? One last push, and your baby is born. · The third stage is when a few more contractions push out the placenta. This may take 30 minutes or less. · The fourth stage is the welcome recovery. You may feel overwhelmed with emotions and exhausted but alert. This is a good time to start breastfeeding. Where can you learn more? Go to http://odessa-hernandez.info/. Enter R538 in the search box to learn more about \"Weeks 34 to 36 of Your Pregnancy: Care Instructions. \"  Current as of: September 5, 2018  Content Version: 11.9  © 2028-5191 [employer]. Care instructions adapted under license by [employer] (which disclaims liability or warranty for this information). If you have questions about a medical condition or this instruction, always ask your healthcare professional. [address] any warranty or liability for your use of this information.

## 2019-04-12 NOTE — ED NOTES
10:21 PM  04/11/19     Discharge instructions given to Shaq Rios (name) with verbalization of understanding. Patient accompanied by self. Patient discharged with the following prescriptions pepcid & amoxicillin. Patient discharged to home (destination).       Jay Kurtz

## 2019-04-12 NOTE — ED PROVIDER NOTES
HPI Patient is a 20 yo female who is 33 weeks pregnant. She states she has a hx of GERD. She vomiting once today with a little blood in it. No other vomiting at present. Past Medical History:   Diagnosis Date    Asthma 0    Dysmenorrhea in the adolescent 8/2/2011    Gonorrhea        History reviewed. No pertinent surgical history.       Family History:   Problem Relation Age of Onset    Hypertension Paternal Grandmother     Diabetes Paternal Grandmother     Asthma Paternal Grandmother     Stroke Paternal Grandmother     Asthma Father     Asthma Brother     Asthma Paternal Uncle        Social History     Socioeconomic History    Marital status: SINGLE     Spouse name: Not on file    Number of children: Not on file    Years of education: Not on file    Highest education level: Not on file   Occupational History    Not on file   Social Needs    Financial resource strain: Not on file    Food insecurity:     Worry: Not on file     Inability: Not on file    Transportation needs:     Medical: Not on file     Non-medical: Not on file   Tobacco Use    Smoking status: Never Smoker    Smokeless tobacco: Never Used   Substance and Sexual Activity    Alcohol use: Yes     Comment: occassional use    Drug use: No     Comment: denies    Sexual activity: Yes     Partners: Male     Birth control/protection: Condom   Lifestyle    Physical activity:     Days per week: Not on file     Minutes per session: Not on file    Stress: Not on file   Relationships    Social connections:     Talks on phone: Not on file     Gets together: Not on file     Attends Denominational service: Not on file     Active member of club or organization: Not on file     Attends meetings of clubs or organizations: Not on file     Relationship status: Not on file    Intimate partner violence:     Fear of current or ex partner: Not on file     Emotionally abused: Not on file     Physically abused: Not on file     Forced sexual activity: Not on file   Other Topics Concern    Not on file   Social History Narrative    Not on file         ALLERGIES: Patient has no known allergies. Review of Systems   Constitutional: Negative. HENT: Negative. Eyes: Negative. Respiratory: Negative. Cardiovascular: Negative. Gastrointestinal: Negative. Endocrine: Negative. Genitourinary: Negative. Musculoskeletal: Negative. Skin: Negative. Allergic/Immunologic: Negative. Neurological: Negative. Hematological: Negative. Psychiatric/Behavioral: Negative. All other systems reviewed and are negative. Vitals:    04/11/19 2011   BP: 107/68   Pulse: 95   Resp: 18   Temp: 98.3 °F (36.8 °C)   SpO2: 99%   Weight: 59 kg (130 lb)   Height: 5' 2\" (1.575 m)            Physical Exam   Constitutional: She is oriented to person, place, and time. She appears well-developed and well-nourished. No distress. HENT:   Head: Normocephalic. Right Ear: External ear normal.   Left Ear: External ear normal.   Mouth/Throat: No oropharyngeal exudate. Eyes: Pupils are equal, round, and reactive to light. Conjunctivae and EOM are normal. Right eye exhibits no discharge. Left eye exhibits no discharge. No scleral icterus. Neck: Normal range of motion. Neck supple. No JVD present. No tracheal deviation present. No thyromegaly present. Cardiovascular: Normal rate, regular rhythm, normal heart sounds and intact distal pulses. Exam reveals no gallop and no friction rub. No murmur heard. Pulmonary/Chest: Effort normal and breath sounds normal. No stridor. No respiratory distress. She has no wheezes. She has no rales. She exhibits no tenderness. Abdominal: Soft. Bowel sounds are normal. She exhibits no distension and no mass. There is no tenderness. There is no rebound and no guarding. Musculoskeletal: Normal range of motion. She exhibits no edema or tenderness. Lymphadenopathy:     She has no cervical adenopathy.    Neurological: She is alert and oriented to person, place, and time. She displays normal reflexes. No cranial nerve deficit. She exhibits normal muscle tone. Coordination normal.   Skin: Skin is warm and dry. No rash noted. She is not diaphoretic. No erythema. No pallor. Nursing note and vitals reviewed. Uterus: 33 weeks in size.      MDM:     Dx: pregnancy vomiting     Disp: D/C

## 2019-04-12 NOTE — ED TRIAGE NOTES
Patient states that she is 34 weeks pregnant. She states vomiting x 3 days. She states hx of GERD. C/o vomitus containing blood. Patient denies abdominal pain or dizziness at present.

## 2020-11-25 ENCOUNTER — HOSPITAL ENCOUNTER (EMERGENCY)
Age: 25
Discharge: HOME | End: 2020-11-25
Attending: EMERGENCY MEDICINE
Payer: COMMERCIAL

## 2020-11-25 ENCOUNTER — APPOINTMENT (OUTPATIENT)
Dept: ULTRASOUND IMAGING | Age: 25
End: 2020-11-25
Attending: STUDENT IN AN ORGANIZED HEALTH CARE EDUCATION/TRAINING PROGRAM
Payer: COMMERCIAL

## 2020-11-25 VITALS
TEMPERATURE: 98.3 F | HEART RATE: 70 BPM | SYSTOLIC BLOOD PRESSURE: 121 MMHG | DIASTOLIC BLOOD PRESSURE: 81 MMHG | OXYGEN SATURATION: 100 % | RESPIRATION RATE: 18 BRPM

## 2020-11-25 DIAGNOSIS — O03.9 SPONTANEOUS ABORTION: Primary | ICD-10-CM

## 2020-11-25 LAB
BASOPHILS # BLD: 0.1 K/UL (ref 0–0.1)
BASOPHILS NFR BLD: 1 % (ref 0–2)
DIFFERENTIAL METHOD BLD: ABNORMAL
EOSINOPHIL # BLD: 0.3 K/UL (ref 0–0.4)
EOSINOPHIL NFR BLD: 4 % (ref 0–5)
ERYTHROCYTE [DISTWIDTH] IN BLOOD BY AUTOMATED COUNT: 14.5 % (ref 11.6–14.5)
HCG SERPL-ACNC: 385 MIU/ML (ref 0–10)
HCT VFR BLD AUTO: 37.1 % (ref 35–45)
HGB BLD-MCNC: 12.4 G/DL (ref 12–16)
LYMPHOCYTES # BLD: 2.9 K/UL (ref 0.9–3.6)
LYMPHOCYTES NFR BLD: 35 % (ref 21–52)
MCH RBC QN AUTO: 28.5 PG (ref 24–34)
MCHC RBC AUTO-ENTMCNC: 33.4 G/DL (ref 31–37)
MCV RBC AUTO: 85.3 FL (ref 74–97)
MONOCYTES # BLD: 0.4 K/UL (ref 0.05–1.2)
MONOCYTES NFR BLD: 5 % (ref 3–10)
NEUTS SEG # BLD: 4.7 K/UL (ref 1.8–8)
NEUTS SEG NFR BLD: 55 % (ref 40–73)
PLATELET # BLD AUTO: 185 K/UL (ref 135–420)
PMV BLD AUTO: 12.9 FL (ref 9.2–11.8)
RBC # BLD AUTO: 4.35 M/UL (ref 4.2–5.3)
WBC # BLD AUTO: 8.3 K/UL (ref 4.6–13.2)

## 2020-11-25 PROCEDURE — 99282 EMERGENCY DEPT VISIT SF MDM: CPT

## 2020-11-25 PROCEDURE — 85025 COMPLETE CBC W/AUTO DIFF WBC: CPT

## 2020-11-25 PROCEDURE — 84702 CHORIONIC GONADOTROPIN TEST: CPT

## 2020-11-25 PROCEDURE — 76817 TRANSVAGINAL US OBSTETRIC: CPT

## 2020-11-26 NOTE — ED PROVIDER NOTES
EMERGENCY DEPARTMENT HISTORY AND PHYSICAL EXAM    8:31 PM      Date: 2020  Patient Name: [facility]    History of Presenting Illness     Chief Complaint   Patient presents with    Pregnancy Problem    Vaginal Bleeding         History Provided By: Patient  Location/Duration/Severity/Modifying factors   Ms. Cheo Antoine is a 20-year-old female -0-1-2 currently at 10+3 based on visualized viable IUP performed by her OB/GYN DrZbigniew Griffith on , who presented to [address] [place] [address] emergency department with 2 days of vaginal bleeding, abdominal cramping, and passage of tissue. She states that she had a similar presentation once before when she had an elective . She states that 2 days ago she had heavy vaginal bleeding with passage of tissue and cramping, with resolution of the cramping over the previous 2 days and continuous intermittent spotting. She states that her previous nausea that accompanied her pregnancy has since subsided over the past 2 days. She denies any fever, chills, or abnormal vaginal discharge. She has no family history of sickle cell disease or coagulopathy. She denies any chest pain, shortness of breath, or joint pain. PCP: None    Current Outpatient Medications   Medication Sig Dispense Refill    ibuprofen (MOTRIN) 800 mg tablet Take 1 Tab by mouth every eight (8) hours as needed for Pain. 30 Tab 0    cholecalciferol (VITAMIN D3) 1,000 unit cap Take  by mouth daily.  PNV66-Iron Fumarate-FA-DSS-DHA 26-1.2- mg cap Take  by mouth. Past History     Past Medical History:  Past Medical History:   Diagnosis Date    Asthma 0    Dysmenorrhea in the adolescent 2011    Gonorrhea        Past Surgical History:  No past surgical history on file.     Family History:  Family History   Problem Relation Age of Onset    Hypertension Paternal Grandmother     Diabetes Paternal Grandmother     Asthma Paternal Grandmother     Stroke Paternal Grandmother     Asthma Father     Asthma Brother     Asthma Paternal Uncle        Social History:  Social History     Tobacco Use    Smoking status: Never Smoker    Smokeless tobacco: Never Used   Substance Use Topics    Alcohol use: Not Currently     Comment: occassional use    Drug use: No     Comment: denies       Allergies:  No Known Allergies      Review of Systems       Review of Systems   Constitutional: Negative for chills, fatigue and fever. HENT: Negative for nosebleeds, postnasal drip, sinus pressure, sinus pain, sneezing, sore throat and trouble swallowing. Eyes: Negative for discharge and itching. Respiratory: Negative for cough, choking, chest tightness and shortness of breath. Cardiovascular: Negative for chest pain, palpitations and leg swelling. Gastrointestinal: Positive for abdominal pain. Negative for abdominal distention, blood in stool, constipation, diarrhea, nausea and vomiting. Genitourinary: Positive for vaginal bleeding. Negative for difficulty urinating, dyspareunia, dysuria, flank pain, hematuria, pelvic pain, urgency, vaginal discharge and vaginal pain. Musculoskeletal: Negative for arthralgias and myalgias. Skin: Negative for color change. Neurological: Negative for dizziness, syncope, weakness, light-headedness, numbness and headaches. Hematological: Does not bruise/bleed easily. Physical Exam     Visit Vitals  /81 (BP 1 Location: Left arm, BP Patient Position: At rest)   Pulse 70   Temp 98.3 °F (36.8 °C)   Resp 18   SpO2 100%         Physical Exam  Vitals signs and nursing note reviewed. Constitutional:       General: She is not in acute distress. Appearance: Normal appearance. She is normal weight. She is not ill-appearing, toxic-appearing or diaphoretic. HENT:      Head: Normocephalic and atraumatic. Eyes:      Extraocular Movements: Extraocular movements intact.       Conjunctiva/sclera: Conjunctivae normal.      Pupils: Pupils are equal, round, and reactive to light. Cardiovascular:      Rate and Rhythm: Normal rate and regular rhythm. Pulses: Normal pulses. Heart sounds: Normal heart sounds. Pulmonary:      Effort: Pulmonary effort is normal.      Breath sounds: Normal breath sounds. Abdominal:      General: Abdomen is flat. There is no distension. Palpations: Abdomen is soft. There is no mass. Tenderness: There is no abdominal tenderness. There is no guarding or rebound. Skin:     General: Skin is warm and dry. Neurological:      General: No focal deficit present. Mental Status: She is alert. Mental status is at baseline. She is disoriented. Psychiatric:         Mood and Affect: Mood normal.         Behavior: Behavior normal.         Thought Content: Thought content normal.         Judgment: Judgment normal.           Diagnostic Study Results     Labs -  Recent Results (from the past 12 hour(s))   CBC WITH AUTOMATED DIFF    Collection Time: 11/25/20  9:48 PM   Result Value Ref Range    WBC 8.3 4.6 - 13.2 K/uL    RBC 4.35 4.20 - 5.30 M/uL    HGB 12.4 12.0 - 16.0 g/dL    HCT 37.1 35.0 - 45.0 %    MCV 85.3 74.0 - 97.0 FL    MCH 28.5 24.0 - 34.0 PG    MCHC 33.4 31.0 - 37.0 g/dL    RDW 14.5 11.6 - 14.5 %    PLATELET 016 612 - 123 K/uL    MPV 12.9 (H) 9.2 - 11.8 FL    NEUTROPHILS 55 40 - 73 %    LYMPHOCYTES 35 21 - 52 %    MONOCYTES 5 3 - 10 %    EOSINOPHILS 4 0 - 5 %    BASOPHILS 1 0 - 2 %    ABS. NEUTROPHILS 4.7 1.8 - 8.0 K/UL    ABS. LYMPHOCYTES 2.9 0.9 - 3.6 K/UL    ABS. MONOCYTES 0.4 0.05 - 1.2 K/UL    ABS. EOSINOPHILS 0.3 0.0 - 0.4 K/UL    ABS. BASOPHILS 0.1 0.0 - 0.1 K/UL    DF AUTOMATED     BETA HCG, QT    Collection Time: 11/25/20  9:48 PM   Result Value Ref Range    Beta HCG,  (H) 0 - 10 MIU/ML       Radiologic Studies -   US OB < 14 WKS W DOPPLER   Final Result   IMPRESSION:      1. There is no intrauterine gestation identified. There is a small focal fluid   collection measuring 5 mm. Given reported history of previously documented   intrauterine gestation at outside doctor's office, findings suggestive of missed   . This small fluid collection in the uterine cavity is nonspecific.   -Continue close clinical follow-up suggested. -Follow-up pelvic ultrasound in one week to assess for resolution of the focal   fluid in the uterine cavity advised. -Confirmation with provided history suggested to help exclude potential for   ectopic pregnancy. Medical Decision Making   I am the first provider for this patient. I reviewed the vital signs, available nursing notes, past medical history, past surgical history, family history and social history. Vital Signs-Reviewed the patient's vital signs. EKG:    Records Reviewed: Old Medical Records (Time of Review: 8:31 PM)    ED Course: Progress Notes, Reevaluation, and Consults:         Provider Notes (Medical Decision Making):   MDM  Number of Diagnoses or Management Options  Spontaneous :   Diagnosis management comments: Ms. Ca Rico is a 79-year-old [id number] 10+3  who presented to the emergency medical department emergency department with 2 days of vaginal bleeding, cramping, and passage of tissue. Patient has risk factor of having prior elective  and history of STD. Patient is O+, thus no RhoGam indicated at this time. Low concern for infection at this time given lack of abnormal vaginal discharge, lack of elevated white count, and lack of fever. Transvaginal ultrasound revealing no evidence of intrauterine pregnancy, thus with reported prior visualization of intrauterine pregnancy patient presentation is consistent with missed . Ultrasound did reveal a small amount of free fluid, which the patient should follow-up with her OB/GYN provider to evaluate. No concern for anemia at this time given normal hemoglobin level.   Patient educated on etiology and prevalence of spontaneous , and advised to follow-up with her OB/GYN provider. Patient provided strict return precautions in the event of worsening pain or signs of infection, and understood. Procedures    Critical Care Time:       Diagnosis     Clinical Impression: Spontaneous   Disposition: Discharged    Follow-up Information     Follow up With Specialties Details Why Contact Shakira Norris NP Obstetrics & Gynecology, Gynecology, Obstetrics, Obstetrics & Gynecology, Obstetrics & Gynecology Schedule an appointment as soon as possible for a visit in 1 week  [address] [place] [address]   [phone]             Patient's Medications   Start Taking    No medications on file   Continue Taking    CHOLECALCIFEROL (VITAMIN D3) 1,000 UNIT CAP    Take  by mouth daily. IBUPROFEN (MOTRIN) 800 MG TABLET    Take 1 Tab by mouth every eight (8) hours as needed for Pain. PNV66-IRON FUMARATE-FA-DSS-DHA 26-1.2- MG CAP    Take  by mouth. These Medications have changed    No medications on file   Stop Taking    No medications on file     Disclaimer: Sections of this note are dictated using utilizing voice recognition software. Minor typographical errors may be present. If questions arise, please do not hesitate to contact me or call our department. I personally saw and examined the patient. I have reviewed and agree with the resident's findings, including all diagnostic interpretations, and plans as written. I was present during the key portions of separately billed procedures.     [address], DO

## 2020-11-26 NOTE — DISCHARGE INSTRUCTIONS
We have determined that you are safe for discharge today to your stay in the emergency department. Please return to the emergency department if you experience worsening pain, fevers, chills, or abnormal vaginal discharge. Please follow-up with your OB/GYN provider next week in order to have a repeat ultrasound to evaluate for the small amount of fluid seen on the ultrasound.

## 2020-11-26 NOTE — ED TRIAGE NOTES
Patient A/O x 4, presented to ED with complaint of vaginal spotting x today. Patient states she's 9 weeks pregnant and x 2 days was having abdominal cramping and bleeding. Patient states she may have seen some passing of tissue and cramping has stopped. Patient states she's currently having vaginal spotting.

## 2021-05-02 PROCEDURE — 96372 THER/PROPH/DIAG INJ SC/IM: CPT

## 2021-05-02 PROCEDURE — 99284 EMERGENCY DEPT VISIT MOD MDM: CPT

## 2021-05-03 ENCOUNTER — HOSPITAL ENCOUNTER (EMERGENCY)
Age: 26
Discharge: HOME | End: 2021-05-03
Attending: EMERGENCY MEDICINE
Payer: COMMERCIAL

## 2021-05-03 VITALS
RESPIRATION RATE: 16 BRPM | HEIGHT: 62 IN | HEART RATE: 88 BPM | OXYGEN SATURATION: 100 % | DIASTOLIC BLOOD PRESSURE: 78 MMHG | BODY MASS INDEX: 27.6 KG/M2 | TEMPERATURE: 98 F | SYSTOLIC BLOOD PRESSURE: 116 MMHG | WEIGHT: 150 LBS

## 2021-05-03 DIAGNOSIS — N76.0 BACTERIAL VAGINITIS: Primary | ICD-10-CM

## 2021-05-03 DIAGNOSIS — B96.89 BACTERIAL VAGINITIS: Primary | ICD-10-CM

## 2021-05-03 LAB
APPEARANCE UR: NORMAL
BILIRUB UR QL: NEGATIVE
COLOR UR: YELLOW
GLUCOSE UR STRIP.AUTO-MCNC: NEGATIVE MG/DL
HGB UR QL STRIP: NEGATIVE
KETONES UR QL STRIP.AUTO: NEGATIVE MG/DL
LEUKOCYTE ESTERASE UR QL STRIP.AUTO: NEGATIVE
NITRITE UR QL STRIP.AUTO: NEGATIVE
PH UR STRIP: 5.5 [PH] (ref 5–8)
PROT UR STRIP-MCNC: NEGATIVE MG/DL
SERVICE CMNT-IMP: NORMAL
SP GR UR REFRACTOMETRY: 1.02 (ref 1–1.03)
UROBILINOGEN UR QL STRIP.AUTO: 1 EU/DL (ref 0.2–1)
WET PREP GENITAL: NORMAL

## 2021-05-03 PROCEDURE — 87210 SMEAR WET MOUNT SALINE/INK: CPT

## 2021-05-03 PROCEDURE — 74011250636 HC RX REV CODE- 250/636: Performed by: EMERGENCY MEDICINE

## 2021-05-03 PROCEDURE — 74011250637 HC RX REV CODE- 250/637: Performed by: EMERGENCY MEDICINE

## 2021-05-03 PROCEDURE — 96372 THER/PROPH/DIAG INJ SC/IM: CPT

## 2021-05-03 PROCEDURE — 74011000250 HC RX REV CODE- 250: Performed by: EMERGENCY MEDICINE

## 2021-05-03 PROCEDURE — 87491 CHLMYD TRACH DNA AMP PROBE: CPT

## 2021-05-03 PROCEDURE — 81003 URINALYSIS AUTO W/O SCOPE: CPT

## 2021-05-03 RX ORDER — ACETAMINOPHEN 500 MG
500 TABLET ORAL
Status: COMPLETED | OUTPATIENT
Start: 2021-05-03 | End: 2021-05-03

## 2021-05-03 RX ORDER — DOXYCYCLINE 100 MG/1
100 CAPSULE ORAL
Status: COMPLETED | OUTPATIENT
Start: 2021-05-03 | End: 2021-05-03

## 2021-05-03 RX ORDER — IBUPROFEN 600 MG/1
600 TABLET ORAL
Qty: 20 TAB | Refills: 0 | Status: SHIPPED | OUTPATIENT
Start: 2021-05-03 | End: 2022-08-29

## 2021-05-03 RX ORDER — IBUPROFEN 600 MG/1
600 TABLET ORAL
Status: DISCONTINUED | OUTPATIENT
Start: 2021-05-03 | End: 2021-05-03

## 2021-05-03 RX ORDER — METRONIDAZOLE 500 MG/1
500 TABLET ORAL
Status: DISCONTINUED | OUTPATIENT
Start: 2021-05-03 | End: 2021-05-03 | Stop reason: SDUPTHER

## 2021-05-03 RX ORDER — METRONIDAZOLE 500 MG/1
500 TABLET ORAL
Status: DISCONTINUED | OUTPATIENT
Start: 2021-05-03 | End: 2021-05-03

## 2021-05-03 RX ORDER — METRONIDAZOLE 500 MG/1
500 TABLET ORAL 2 TIMES DAILY
Qty: 14 TAB | Refills: 0 | Status: SHIPPED | OUTPATIENT
Start: 2021-05-03 | End: 2021-05-10

## 2021-05-03 RX ADMIN — ACETAMINOPHEN 500 MG: 500 TABLET, FILM COATED ORAL at 01:38

## 2021-05-03 RX ADMIN — DOXYCYCLINE HYCLATE 100 MG: 100 CAPSULE ORAL at 02:12

## 2021-05-03 RX ADMIN — LIDOCAINE HYDROCHLORIDE 500 MG: 10 INJECTION, SOLUTION EPIDURAL; INFILTRATION; INTRACAUDAL; PERINEURAL at 02:12

## 2021-05-03 NOTE — ED PROVIDER NOTES
HPI Patient is a 21 yo female who states that she has had right side flank discomfort and vaginal discharge for x 1 week. She denies having lower abdominal pain,  burning with urination or frequency. Patient also requested to be treated for STD exposure. Past Medical History:   Diagnosis Date    Asthma 0    Dysmenorrhea in the adolescent 8/2/2011    Gonorrhea        History reviewed. No pertinent surgical history.       Family History:   Problem Relation Age of Onset    Hypertension Paternal Grandmother     Diabetes Paternal Grandmother     Asthma Paternal Grandmother     Stroke Paternal Grandmother     Asthma Father     Asthma Brother     Asthma Paternal Uncle        Social History     Socioeconomic History    Marital status: SINGLE     Spouse name: Not on file    Number of children: Not on file    Years of education: Not on file    Highest education level: Not on file   Occupational History    Not on file   Social Needs    Financial resource strain: Not on file    Food insecurity     Worry: Not on file     Inability: Not on file   [employer] needs     Medical: Not on file     Non-medical: Not on file   Tobacco Use    Smoking status: Never Smoker    Smokeless tobacco: Never Used   Substance and Sexual Activity    Alcohol use: Not Currently     Comment: occassional use    Drug use: No     Comment: denies    Sexual activity: Yes     Partners: Male     Birth control/protection: Condom   Lifestyle    Physical activity     Days per week: Not on file     Minutes per session: Not on file    Stress: Not on file   Relationships    Social connections     Talks on phone: Not on file     Gets together: Not on file     Attends [employer] service: Not on file     Active member of club or organization: Not on file     Attends meetings of clubs or organizations: Not on file     Relationship status: Not on file    Intimate partner violence     Fear of current or ex partner: Not on file Emotionally abused: Not on file     Physically abused: Not on file     Forced sexual activity: Not on file   Other Topics Concern     Service Not Asked    Blood Transfusions Not Asked    Caffeine Concern Not Asked    Occupational Exposure Not Asked    Hobby Hazards Not Asked    Sleep Concern Not Asked    Stress Concern Not Asked    Weight Concern Not Asked    Special Diet Not Asked    Back Care Not Asked    Exercise Not Asked    Bike Helmet Not Asked   [address] Not Asked    Self-Exams Not Asked   Social History Narrative    Not on file         ALLERGIES: Patient has no known allergies. Review of Systems   Constitutional: Negative. HENT: Negative. Eyes: Negative. Respiratory: Negative. Cardiovascular: Negative. Gastrointestinal: Negative. Endocrine: Negative. Genitourinary: Positive for vaginal discharge. Negative for difficulty urinating, dysuria, flank pain, hematuria, vaginal bleeding and vaginal pain. Musculoskeletal: Negative. Skin: Negative. Allergic/Immunologic: Negative. Neurological: Negative. Hematological: Negative. Psychiatric/Behavioral: Negative. All other systems reviewed and are negative. Vitals:    05/03/21 0028   BP: 117/68   Pulse: 99   Resp: 16   Temp: 98 °F (36.7 °C)   SpO2: 100%   Weight: 68 kg (150 lb)   Height: 5' 2\" (1.575 m)            Physical Exam  Vitals signs and nursing note reviewed. Constitutional:       General: She is not in acute distress. Appearance: She is well-developed. She is not diaphoretic. HENT:      Head: Normocephalic. Right Ear: External ear normal.      Left Ear: External ear normal.      Mouth/Throat:      Pharynx: No oropharyngeal exudate. Eyes:      General: No scleral icterus. Right eye: No discharge. Left eye: No discharge. Conjunctiva/sclera: Conjunctivae normal.      Pupils: Pupils are equal, round, and reactive to light.    Neck:      Musculoskeletal: Normal range of motion and neck supple. Thyroid: No thyromegaly. Vascular: No JVD. Trachea: No tracheal deviation. Cardiovascular:      Rate and Rhythm: Normal rate and regular rhythm. Heart sounds: Normal heart sounds. No murmur. No friction rub. No gallop. Pulmonary:      Effort: Pulmonary effort is normal. No respiratory distress. Breath sounds: Normal breath sounds. No stridor. No wheezing or rales. Chest:      Chest wall: No tenderness. Abdominal:      General: Bowel sounds are normal. There is no distension. Palpations: Abdomen is soft. There is no mass. Tenderness: There is no abdominal tenderness. There is no guarding or rebound. Musculoskeletal: Normal range of motion. General: No tenderness. Lymphadenopathy:      Cervical: No cervical adenopathy. Skin:     General: Skin is warm and dry. Coloration: Skin is not pale. Findings: No erythema or rash. Neurological:      Mental Status: She is alert and oriented to person, place, and time. Cranial Nerves: No cranial nerve deficit. Motor: No abnormal muscle tone. Coordination: Coordination normal.      Deep Tendon Reflexes: Reflexes normal.          MDM  Number of Diagnoses or Management Options     Amount and/or Complexity of Data Reviewed  Clinical lab tests: ordered and reviewed    Risk of Complications, Morbidity, and/or Mortality  Presenting problems: moderate  Diagnostic procedures: moderate  Management options: moderate           Procedures  Parental diagnosis: Bacterial vaginitis, GC, UTI, pyelonephritis, kidney stone    Hospital course: Patient main stable throughout ER evaluation. Patient treated with doxycycline and  rocephine. Diagnosis: Acute bacterial vaginitis    Disposition: Discharge home, follow-up GYN 5 days. Return ER needed. Rx: Flaygl and doxycyline.       Dictation disclaimer:  Please note that this dictation was completed with Dragon, the computer voice recognition software. Quite often unanticipated grammatical, syntax, homophones, and other interpretive errors are inadvertently transcribed by the computer software. Please disregard these errors. Please excuse any errors that have escaped final proofreading.

## 2021-05-03 NOTE — ED TRIAGE NOTES
Patient states that she has had right side flank discomfort for x 1 week Not so much pain with burning with urination

## 2021-05-03 NOTE — LETTER
6/19/2021 MsZbigniew Bonilla Duran [address] [phone] Dear Ms. Raffi Potts: The results of your lab work performed in our office were abnormal and we have had difficulty reaching you by telephone. Please contact our office as soon as possible to discuss these results. Sincerely, No name on file.

## 2021-05-05 LAB
C TRACH RRNA SPEC QL NAA+PROBE: POSITIVE
N GONORRHOEA RRNA SPEC QL NAA+PROBE: POSITIVE
PLEASE NOTE:, 188601: ABNORMAL
SPECIMEN SOURCE: ABNORMAL

## 2021-07-12 ENCOUNTER — HOSPITAL ENCOUNTER (EMERGENCY)
Age: 26
Discharge: STILL ADMITTED OR ADMINISTRATIVE | End: 2021-07-12

## 2021-07-12 ENCOUNTER — HOSPITAL ENCOUNTER (EMERGENCY)
Age: 26
Discharge: HOME | End: 2021-07-13
Attending: EMERGENCY MEDICINE
Payer: COMMERCIAL

## 2021-07-12 VITALS
HEART RATE: 68 BPM | HEIGHT: 62 IN | RESPIRATION RATE: 18 BRPM | DIASTOLIC BLOOD PRESSURE: 80 MMHG | BODY MASS INDEX: 27.6 KG/M2 | WEIGHT: 150 LBS | OXYGEN SATURATION: 100 % | SYSTOLIC BLOOD PRESSURE: 118 MMHG | TEMPERATURE: 98.6 F

## 2021-07-12 DIAGNOSIS — Z32.01 POSITIVE PREGNANCY TEST: Primary | ICD-10-CM

## 2021-07-12 LAB
ABO + RH BLD: NORMAL
ALBUMIN SERPL-MCNC: 3.6 G/DL (ref 3.4–5)
ALBUMIN/GLOB SERPL: 0.9 {RATIO} (ref 0.8–1.7)
ALP SERPL-CCNC: 77 U/L (ref 45–117)
ALT SERPL-CCNC: 15 U/L (ref 13–56)
ANION GAP SERPL CALC-SCNC: 4 MMOL/L (ref 3–18)
AST SERPL-CCNC: 12 U/L (ref 10–38)
BASOPHILS # BLD: 0.1 K/UL (ref 0–0.1)
BASOPHILS NFR BLD: 1 % (ref 0–2)
BILIRUB SERPL-MCNC: 0.3 MG/DL (ref 0.2–1)
BUN SERPL-MCNC: 8 MG/DL (ref 7–18)
BUN/CREAT SERPL: 12 (ref 12–20)
CALCIUM SERPL-MCNC: 9 MG/DL (ref 8.5–10.1)
CHLORIDE SERPL-SCNC: 110 MMOL/L (ref 100–111)
CO2 SERPL-SCNC: 29 MMOL/L (ref 21–32)
CREAT SERPL-MCNC: 0.66 MG/DL (ref 0.6–1.3)
DIFFERENTIAL METHOD BLD: ABNORMAL
EOSINOPHIL # BLD: 0.1 K/UL (ref 0–0.4)
EOSINOPHIL NFR BLD: 2 % (ref 0–5)
ERYTHROCYTE [DISTWIDTH] IN BLOOD BY AUTOMATED COUNT: 13.8 % (ref 11.6–14.5)
GLOBULIN SER CALC-MCNC: 4 G/DL (ref 2–4)
GLUCOSE SERPL-MCNC: 96 MG/DL (ref 74–99)
HCG SERPL QL: POSITIVE
HCG SERPL-ACNC: 71 MIU/ML (ref 0–10)
HCT VFR BLD AUTO: 33.9 % (ref 35–45)
HGB BLD-MCNC: 11.3 G/DL (ref 12–16)
LYMPHOCYTES # BLD: 2.4 K/UL (ref 0.9–3.6)
LYMPHOCYTES NFR BLD: 32 % (ref 21–52)
MCH RBC QN AUTO: 30 PG (ref 24–34)
MCHC RBC AUTO-ENTMCNC: 33.3 G/DL (ref 31–37)
MCV RBC AUTO: 89.9 FL (ref 74–97)
MONOCYTES # BLD: 0.4 K/UL (ref 0.05–1.2)
MONOCYTES NFR BLD: 6 % (ref 3–10)
NEUTS SEG # BLD: 4.4 K/UL (ref 1.8–8)
NEUTS SEG NFR BLD: 59 % (ref 40–73)
PLATELET # BLD AUTO: 191 K/UL (ref 135–420)
PLATELET COMMENTS,PCOM: ABNORMAL
PMV BLD AUTO: 12.2 FL (ref 9.2–11.8)
POTASSIUM SERPL-SCNC: 3.9 MMOL/L (ref 3.5–5.5)
PROT SERPL-MCNC: 7.6 G/DL (ref 6.4–8.2)
RBC # BLD AUTO: 3.77 M/UL (ref 4.2–5.3)
RBC MORPH BLD: ABNORMAL
SODIUM SERPL-SCNC: 143 MMOL/L (ref 136–145)
WBC # BLD AUTO: 7.4 K/UL (ref 4.6–13.2)

## 2021-07-12 PROCEDURE — 85025 COMPLETE CBC W/AUTO DIFF WBC: CPT

## 2021-07-12 PROCEDURE — 80053 COMPREHEN METABOLIC PANEL: CPT

## 2021-07-12 PROCEDURE — 84702 CHORIONIC GONADOTROPIN TEST: CPT

## 2021-07-12 PROCEDURE — 99281 EMR DPT VST MAYX REQ PHY/QHP: CPT

## 2021-07-12 PROCEDURE — 84703 CHORIONIC GONADOTROPIN ASSAY: CPT

## 2021-07-12 PROCEDURE — 86900 BLOOD TYPING SEROLOGIC ABO: CPT

## 2021-07-12 PROCEDURE — 96360 HYDRATION IV INFUSION INIT: CPT

## 2021-07-12 PROCEDURE — 74011250636 HC RX REV CODE- 250/636: Performed by: EMERGENCY MEDICINE

## 2021-07-12 RX ORDER — METOCLOPRAMIDE HYDROCHLORIDE 5 MG/ML
10 INJECTION INTRAMUSCULAR; INTRAVENOUS
Status: DISCONTINUED | OUTPATIENT
Start: 2021-07-12 | End: 2021-07-13 | Stop reason: HOSPADM

## 2021-07-12 RX ADMIN — SODIUM CHLORIDE 1000 ML: 900 INJECTION, SOLUTION INTRAVENOUS at 19:38

## 2021-07-12 NOTE — ED PROVIDER NOTES
EMERGENCY DEPARTMENT HISTORY AND PHYSICAL EXAM    Date: 2021  Patient Name: [facility]    History of Presenting Illness     Chief Complaint   Patient presents with    Pregnancy Problem         History Provided By: Patient      Additional History (Context): Charlotte Cevallos is a 22 y.o. female with [id number] with pharmaceutical  2 weeks ago. who presents with nausea, positive pregnancy test x 3d. Finished bleeding from the  last week. Now having positive pregnancy tests; her GYN said to come for further eval.  Denies additional bleeding, vomiting, pelvic pain, fever. PCP: None    Current Facility-Administered Medications   Medication Dose Route Frequency Provider Last Rate Last Admin    metoclopramide HCl (REGLAN) injection 10 mg  10 mg IntraVENous NOW Elizabeth Mishra PA         Current Outpatient Medications   Medication Sig Dispense Refill    ibuprofen (MOTRIN) 600 mg tablet Take 1 Tab by mouth every six (6) hours as needed for Pain. 20 Tab 0    ibuprofen (MOTRIN) 600 mg tablet Take 1 Tab by mouth every six (6) hours as needed for Pain. 20 Tab 0       Past History     Past Medical History:  Past Medical History:   Diagnosis Date    Asthma 0    Dysmenorrhea in the adolescent 2011    Gonorrhea        Past Surgical History:  No past surgical history on file.     Family History:  Family History   Problem Relation Age of Onset    Hypertension Paternal Grandmother     Diabetes Paternal Grandmother     Asthma Paternal Grandmother     Stroke Paternal Grandmother     Asthma Father     Asthma Brother     Asthma Paternal Uncle        Social History:  Social History     Tobacco Use    Smoking status: Never Smoker    Smokeless tobacco: Never Used   Substance Use Topics    Alcohol use: Not Currently     Comment: occassional use    Drug use: No     Comment: denies       Allergies:  No Known Allergies      Review of Systems   Review of Systems   Constitutional: Negative for fever.   HENT: Negative. Eyes: Negative. Respiratory: Negative. Cardiovascular: Negative. Gastrointestinal: Positive for nausea. Endocrine: Negative. Genitourinary: Negative for pelvic pain, vaginal bleeding and vaginal discharge. Musculoskeletal: Negative. Skin: Negative. Allergic/Immunologic: Negative. Neurological: Negative. Hematological: Negative. Psychiatric/Behavioral: Negative. All Other Systems Negative  Physical Exam     Vitals:    07/12/21 1927   BP: 118/80   Pulse: 68   Resp: 18   Temp: 98.6 °F (37 °C)   SpO2: 100%   Weight: 68 kg (150 lb)   Height: 5' 2\" (1.575 m)     Physical Exam  Vitals and nursing note reviewed. Constitutional:       Appearance: She is well-developed. HENT:      Head: Normocephalic and atraumatic. Right Ear: External ear normal.      Left Ear: External ear normal.      Nose: Nose normal.   Eyes:      Conjunctiva/sclera: Conjunctivae normal.      Pupils: Pupils are equal, round, and reactive to light. Neck:      Vascular: No JVD. Trachea: No tracheal deviation. Cardiovascular:      Rate and Rhythm: Normal rate and regular rhythm. Heart sounds: Normal heart sounds. No murmur heard. No friction rub. No gallop. Pulmonary:      Effort: Pulmonary effort is normal. No respiratory distress. Breath sounds: Normal breath sounds. No wheezing or rales. Abdominal:      General: Bowel sounds are normal. There is no distension. Palpations: Abdomen is soft. There is no mass. Tenderness: There is no abdominal tenderness. There is no guarding or rebound. Musculoskeletal:         General: No tenderness. Normal range of motion. Cervical back: Normal range of motion and neck supple. Skin:     General: Skin is warm and dry. Findings: No rash. Neurological:      Mental Status: She is alert and oriented to person, place, and time. Cranial Nerves: No cranial nerve deficit.       Deep Tendon Reflexes: Reflexes are normal and symmetric. Psychiatric:         Behavior: Behavior normal.            Diagnostic Study Results     Labs -     Recent Results (from the past 12 hour(s))   HCG QL SERUM    Collection Time: 07/12/21  7:30 PM   Result Value Ref Range    HCG, Ql. Positive (A) NEG     BETA HCG, QT    Collection Time: 07/12/21  7:30 PM   Result Value Ref Range    Beta HCG, QT 71 (H) 0 - 10 MIU/ML   CBC WITH AUTOMATED DIFF    Collection Time: 07/12/21  7:30 PM   Result Value Ref Range    WBC 7.4 4.6 - 13.2 K/uL    RBC 3.77 (L) 4.20 - 5.30 M/uL    HGB 11.3 (L) 12.0 - 16.0 g/dL    HCT 33.9 (L) 35.0 - 45.0 %    MCV 89.9 74.0 - 97.0 FL    MCH 30.0 24.0 - 34.0 PG    MCHC 33.3 31.0 - 37.0 g/dL    RDW 13.8 11.6 - 14.5 %    PLATELET 595 305 - 477 K/uL    MPV 12.2 (H) 9.2 - 11.8 FL    NEUTROPHILS 59 40 - 73 %    LYMPHOCYTES 32 21 - 52 %    MONOCYTES 6 3 - 10 %    EOSINOPHILS 2 0 - 5 %    BASOPHILS 1 0 - 2 %    ABS. NEUTROPHILS 4.4 1.8 - 8.0 K/UL    ABS. LYMPHOCYTES 2.4 0.9 - 3.6 K/UL    ABS. MONOCYTES 0.4 0.05 - 1.2 K/UL    ABS. EOSINOPHILS 0.1 0.0 - 0.4 K/UL    ABS. BASOPHILS 0.1 0.0 - 0.1 K/UL    DF MANUAL      PLATELET COMMENTS ADEQUATE PLATELETS      RBC COMMENTS NORMOCYTIC, NORMOCHROMIC     METABOLIC PANEL, COMPREHENSIVE    Collection Time: 07/12/21  7:30 PM   Result Value Ref Range    Sodium 143 136 - 145 mmol/L    Potassium 3.9 3.5 - 5.5 mmol/L    Chloride 110 100 - 111 mmol/L    CO2 29 21 - 32 mmol/L    Anion gap 4 3.0 - 18 mmol/L    Glucose 96 74 - 99 mg/dL    BUN 8 7.0 - 18 MG/DL    Creatinine 0.66 0.6 - 1.3 MG/DL    BUN/Creatinine ratio 12 12 - 20      GFR est AA >60 >60 ml/min/1.73m2    GFR est non-AA >60 >60 ml/min/1.73m2    Calcium 9.0 8.5 - 10.1 MG/DL    Bilirubin, total 0.3 0.2 - 1.0 MG/DL    ALT (SGPT) 15 13 - 56 U/L    AST (SGOT) 12 10 - 38 U/L    Alk.  phosphatase 77 45 - 117 U/L    Protein, total 7.6 6.4 - 8.2 g/dL    Albumin 3.6 3.4 - 5.0 g/dL    Globulin 4.0 2.0 - 4.0 g/dL    A-G Ratio 0.9 0.8 - 1.7 Radiologic Studies -   US PREG UTS < 14 WKS SNGL    (Results Pending)     CT Results  (Last 48 hours)    None        CXR Results  (Last 48 hours)    None            Medical Decision Making   I am the first provider for this patient. I reviewed the vital signs, available nursing notes, past medical history, past surgical history, family history and social history. Vital Signs-Reviewed the patient's vital signs. Records Reviewed: Nursing Notes    Procedures:  Procedures    Provider Notes (Medical Decision Making):     Pt does have quant of 71. Will obtain US to r/o retained POC or ectopic. In no distress; pt understands even if no findings on US tonight, will need repeat bloodwork in 2d to reassess the hormone level. Pt distressed by how long she might have to wait; [place] called back to say her arrival would be w/in 30min. Pt said she didn't want to stay as she had to  her children. Says she could return in the morning which is her right; counseled her on staying to finish visit as she could have ectopic. Pt eloped w/IV in place. Registration unable to contact pt so [place] police contacted and will search her home and surrounding area. MED RECONCILIATION:  Current Facility-Administered Medications   Medication Dose Route Frequency    metoclopramide HCl (REGLAN) injection 10 mg  10 mg IntraVENous NOW     Current Outpatient Medications   Medication Sig    ibuprofen (MOTRIN) 600 mg tablet Take 1 Tab by mouth every six (6) hours as needed for Pain.  ibuprofen (MOTRIN) 600 mg tablet Take 1 Tab by mouth every six (6) hours as needed for Pain. Disposition:  eloped    Follow-up Information    None         Current Discharge Medication List            Diagnosis     Clinical Impression:   1.  Positive pregnancy test

## 2021-07-12 NOTE — ED TRIAGE NOTES
Pt states she had a miscarriage 2 weeks ago and still having a positive pregnancy test at home along with nausea and feeling tired. Pt states she felt like this at the beginning of her pregnancy and she was told to return to make sure she is not have a new pregnancy.

## 2021-07-13 ENCOUNTER — APPOINTMENT (OUTPATIENT)
Dept: ULTRASOUND IMAGING | Age: 26
End: 2021-07-13
Attending: EMERGENCY MEDICINE
Payer: COMMERCIAL

## 2022-08-29 ENCOUNTER — HOSPITAL ENCOUNTER (EMERGENCY)
Age: 27
Discharge: HOME | End: 2022-08-29
Attending: EMERGENCY MEDICINE
Payer: MEDICAID

## 2022-08-29 VITALS
WEIGHT: 131 LBS | SYSTOLIC BLOOD PRESSURE: 126 MMHG | TEMPERATURE: 98.1 F | OXYGEN SATURATION: 100 % | HEART RATE: 110 BPM | BODY MASS INDEX: 23.96 KG/M2 | DIASTOLIC BLOOD PRESSURE: 81 MMHG | RESPIRATION RATE: 16 BRPM

## 2022-08-29 DIAGNOSIS — B96.89 BV (BACTERIAL VAGINOSIS): ICD-10-CM

## 2022-08-29 DIAGNOSIS — N76.0 BV (BACTERIAL VAGINOSIS): ICD-10-CM

## 2022-08-29 DIAGNOSIS — Z20.2 EXPOSURE TO STD: Primary | ICD-10-CM

## 2022-08-29 LAB
APPEARANCE UR: CLEAR
BACTERIA URNS QL MICRO: NEGATIVE /HPF
BILIRUB UR QL: NEGATIVE
C TRACH RRNA SPEC QL NAA+PROBE: POSITIVE
COLOR UR: YELLOW
EPITH CASTS URNS QL MICRO: NORMAL /LPF (ref 0–5)
GLUCOSE UR STRIP.AUTO-MCNC: NEGATIVE MG/DL
HCG UR QL: NEGATIVE
HGB UR QL STRIP: NEGATIVE
KETONES UR QL STRIP.AUTO: NEGATIVE MG/DL
LEUKOCYTE ESTERASE UR QL STRIP.AUTO: ABNORMAL
N GONORRHOEA RRNA SPEC QL NAA+PROBE: NEGATIVE
NITRITE UR QL STRIP.AUTO: NEGATIVE
PH UR STRIP: 5.5 [PH] (ref 5–8)
PROT UR STRIP-MCNC: NEGATIVE MG/DL
RBC #/AREA URNS HPF: NEGATIVE /HPF (ref 0–5)
SERVICE CMNT-IMP: NORMAL
SP GR UR REFRACTOMETRY: <1.005 (ref 1–1.03)
SPECIMEN SOURCE: ABNORMAL
UROBILINOGEN UR QL STRIP.AUTO: 0.2 EU/DL (ref 0.2–1)
WBC URNS QL MICRO: NORMAL /HPF (ref 0–4)
WET PREP GENITAL: NORMAL
WET PREP GENITAL: NORMAL

## 2022-08-29 PROCEDURE — 87210 SMEAR WET MOUNT SALINE/INK: CPT

## 2022-08-29 PROCEDURE — 81001 URINALYSIS AUTO W/SCOPE: CPT

## 2022-08-29 PROCEDURE — 99284 EMERGENCY DEPT VISIT MOD MDM: CPT

## 2022-08-29 PROCEDURE — 87491 CHLMYD TRACH DNA AMP PROBE: CPT

## 2022-08-29 PROCEDURE — 81025 URINE PREGNANCY TEST: CPT

## 2022-08-29 PROCEDURE — 96372 THER/PROPH/DIAG INJ SC/IM: CPT

## 2022-08-29 PROCEDURE — 74011250636 HC RX REV CODE- 250/636: Performed by: EMERGENCY MEDICINE

## 2022-08-29 PROCEDURE — 74011000250 HC RX REV CODE- 250: Performed by: EMERGENCY MEDICINE

## 2022-08-29 RX ORDER — DOXYCYCLINE HYCLATE 100 MG
100 TABLET ORAL 2 TIMES DAILY
Qty: 14 TABLET | Refills: 0 | Status: SHIPPED | OUTPATIENT
Start: 2022-08-29 | End: 2022-09-05

## 2022-08-29 RX ORDER — ALBUTEROL SULFATE 5 MG/ML
SOLUTION RESPIRATORY (INHALATION) ONCE
COMMUNITY

## 2022-08-29 RX ORDER — METRONIDAZOLE 500 MG/1
500 TABLET ORAL 2 TIMES DAILY
Qty: 14 TABLET | Refills: 0 | Status: SHIPPED | OUTPATIENT
Start: 2022-08-29 | End: 2022-09-05

## 2022-08-29 RX ADMIN — CEFTRIAXONE SODIUM 500 MG: 500 INJECTION, POWDER, FOR SOLUTION INTRAMUSCULAR; INTRAVENOUS at 05:03

## 2022-08-29 NOTE — ED PROVIDER NOTES
EMERGENCY DEPARTMENT HISTORY AND PHYSICAL EXAM    4:31 AM      Date: 8/29/2022  Patient Name: [facility]ne [facility]    History of Presenting Illness     Chief Complaint   Patient presents with    Vaginal Discharge         History Provided By: Patient  Location/Duration/Severity/Modifying factors   Patient is a 80-year-old female G2, P2 the presents emergency department with complaint of some vaginal discharge and recent exposure to trichomonas and gonorrhea. The patient said that her significant other that he was recently exposed and recommended that she get tested and treated. The emergency department the patient has been having some mild vaginal discharge is foul-smelling otherwise has had no abdominal pain, flank pain, or any other complaints. Patient would like to be treated for STDs. The patient denies any other aggravating or alleviating factors. Patient is not working at this time, is not a smoker, occasional drinker, and denies any drug use. PCP: None    Current Outpatient Medications   Medication Sig Dispense Refill    albuterol (PROVENTIL) 5 mg/mL nebulizer solution by Nebulization route once. Past History     Past Medical History:  Past Medical History:   Diagnosis Date    Asthma 1998    Dysmenorrhea in the adolescent 8/2/2011    Gonorrhea        Past Surgical History:  No past surgical history on file.     Family History:  Family History   Problem Relation Age of Onset    Hypertension Paternal Grandmother     Diabetes Paternal Grandmother     Asthma Paternal Grandmother     Stroke Paternal Grandmother     Asthma Father     Asthma Brother     Asthma Paternal Uncle        Social History:  Social History     Tobacco Use    Smoking status: Never    Smokeless tobacco: Never   Substance Use Topics    Alcohol use: Not Currently     Comment: occassional use    Drug use: No     Comment: denies       Allergies:  No Known Allergies      Review of Systems       Review of Systems   Constitutional: Negative for activity change, fatigue and fever. HENT:  Negative for congestion and rhinorrhea. Eyes:  Negative for visual disturbance. Respiratory:  Negative for shortness of breath. Cardiovascular:  Negative for chest pain and palpitations. Gastrointestinal:  Negative for abdominal pain, diarrhea, nausea and vomiting. Genitourinary:  Positive for vaginal discharge. Negative for dysuria and hematuria. Musculoskeletal:  Negative for back pain. Skin:  Negative for rash. Neurological:  Negative for dizziness, weakness and light-headedness. All other systems reviewed and are negative. Physical Exam   Visit Vitals  /81   Pulse (!) 110   Temp 98.1 °F (36.7 °C)   Resp 16   Wt 59.4 kg (131 lb)   LMP 08/21/2022   SpO2 100%   BMI 23.96 kg/m²         Physical Exam  Vitals and nursing note reviewed. Constitutional:       General: She is not in acute distress. Appearance: She is well-developed. HENT:      Head: Normocephalic and atraumatic. Right Ear: External ear normal.      Left Ear: External ear normal.      Nose: Nose normal.   Eyes:      General: No scleral icterus. Conjunctiva/sclera: Conjunctivae normal.      Pupils: Pupils are equal, round, and reactive to light. Neck:      Thyroid: No thyromegaly. Vascular: No JVD. Trachea: No tracheal deviation. Cardiovascular:      Rate and Rhythm: Normal rate and regular rhythm. Heart sounds: Normal heart sounds. No murmur heard. No friction rub. No gallop. Pulmonary:      Effort: Pulmonary effort is normal.      Breath sounds: Normal breath sounds. Chest:      Chest wall: No tenderness. Abdominal:      General: Bowel sounds are normal. There is no distension. Palpations: Abdomen is soft. Tenderness: There is no abdominal tenderness. There is no guarding or rebound. Musculoskeletal:         General: No tenderness. Normal range of motion.       Cervical back: Normal range of motion and neck supple. Lymphadenopathy:      Cervical: No cervical adenopathy. Skin:     General: Skin is warm and dry. Neurological:      Mental Status: She is alert and oriented to person, place, and time. Cranial Nerves: No cranial nerve deficit. Coordination: Coordination normal.      Comments: No sensory loss, Gait normal, Motor 5/5   Psychiatric:         Behavior: Behavior normal.         Thought Content: Thought content normal.         Judgment: Judgment normal.         Diagnostic Study Results     Labs -  No results found for this or any previous visit (from the past 12 hour(s)). Radiologic Studies -   No orders to display         Medical Decision Making   I am the first provider for this patient. I reviewed the vital signs, available nursing notes, past medical history, past surgical history, family history and social history. Vital Signs-Reviewed the patient's vital signs. Records Reviewed: Nursing Notes, Old Medical Records, Previous Radiology Studies, and Previous Laboratory Studies (Time of Review: 4:31 AM)    ED Course: Progress Notes, Reevaluation, and Consults: The discharge instructions were reviewed with the patient and the patient verbalized understanding. The patient had no questions about the discharge instructions. The patient will follow closely with the outpatient care plan and will return if at all worsened or concerned. Jatinder Calvillo DO 11:27 PM      Provider Notes (Medical Decision Making):   MDM  Number of Diagnoses or Management Options  BV (bacterial vaginosis)  Exposure to STD  Diagnosis management comments: Patient is a 49-year-old female with a history of STI in the past the presents emergency department after exposure to trichomonas and gonorrhea.   We will send the self swabs for the patient, follow her wet prep, and plan to give the patient Rocephin as well as oral antibiotics to go home with once her pregnancy test is reviewed. Procedures        Diagnosis     Clinical Impression:   1. Exposure to STD    2. BV (bacterial vaginosis)        Disposition: DC    Follow-up Information    None          Patient's Medications   Start Taking    No medications on file   Continue Taking    ALBUTEROL (PROVENTIL) 5 MG/ML NEBULIZER SOLUTION    by Nebulization route once. These Medications have changed    No medications on file   Stop Taking    IBUPROFEN (MOTRIN) 600 MG TABLET    Take 1 Tab by mouth every six (6) hours as needed for Pain. IBUPROFEN (MOTRIN) 600 MG TABLET    Take 1 Tab by mouth every six (6) hours as needed for Pain. Disclaimer: Sections of this note are dictated using utilizing voice recognition software. Minor typographical errors may be present. If questions arise, please do not hesitate to contact me or call our department.

## 2022-08-29 NOTE — ROUTINE PROCESS
Master Johnson is a 32 y.o. female that was discharged in stable. Pt was accompanied by self. Pt is driving. The patients diagnosis, condition and treatment were explained to  patient and aftercare instructions were given. The patient verbalized understanding. Patient armband removed and shredded.

## 2022-09-25 ENCOUNTER — HOSPITAL ENCOUNTER (EMERGENCY)
Age: 27
Discharge: LEFT BEFORE DISCHARGE | End: 2022-09-25
Attending: STUDENT IN AN ORGANIZED HEALTH CARE EDUCATION/TRAINING PROGRAM
Payer: MEDICAID

## 2022-09-25 VITALS
OXYGEN SATURATION: 100 % | DIASTOLIC BLOOD PRESSURE: 61 MMHG | RESPIRATION RATE: 16 BRPM | HEART RATE: 99 BPM | TEMPERATURE: 97.8 F | SYSTOLIC BLOOD PRESSURE: 109 MMHG

## 2022-09-25 DIAGNOSIS — Z53.21 PATIENT LEFT WITHOUT BEING SEEN: Primary | ICD-10-CM

## 2022-09-25 PROCEDURE — 75810000275 HC EMERGENCY DEPT VISIT NO LEVEL OF CARE

## 2022-09-25 NOTE — ED NOTES
4:50 PM patient arrived to the ED with complaints of having had a miscarriage at less than 5 weeks pregnant. She states this was a week ago but she continues to have abdominal pain. Patient denies vaginal bleeding in triage  Urine cup was provided to the patient and initial orders were placed. Patient has eloped from the emergency department without being seen by an emergency room provider. Disposition: LWBS    Dictation disclaimer:  Please note that this dictation was completed with [employer], the computer voice recognition software. Quite often unanticipated grammatical, syntax, homophones, and other interpretive errors are inadvertently transcribed by the computer software. Please disregard these errors. Please excuse any errors that have escaped final proofreading.

## 2022-09-25 NOTE — ED TRIAGE NOTES
Patient states had a miscarriage at less at less cas 5 weeks pregnant, 1 week ago, continues with abdominal pain, denies bleeding. .Steady gait. Non-tender abdomen.  Urine cup given at this time

## 2023-04-23 ENCOUNTER — HOSPITAL ENCOUNTER (EMERGENCY)
Facility: HOSPITAL | Age: 28
Discharge: HOME | End: 2023-04-24
Attending: EMERGENCY MEDICINE
Payer: MEDICAID

## 2023-04-23 VITALS
WEIGHT: 125 LBS | DIASTOLIC BLOOD PRESSURE: 80 MMHG | TEMPERATURE: 97.6 F | RESPIRATION RATE: 16 BRPM | OXYGEN SATURATION: 100 % | HEART RATE: 98 BPM | SYSTOLIC BLOOD PRESSURE: 127 MMHG | HEIGHT: 62 IN | BODY MASS INDEX: 23 KG/M2

## 2023-04-23 DIAGNOSIS — T14.8XXA BRUISING: ICD-10-CM

## 2023-04-23 DIAGNOSIS — F41.9 ANXIETY: ICD-10-CM

## 2023-04-23 DIAGNOSIS — T65.91XA ACCIDENTAL INGESTION OF SUBSTANCE, INITIAL ENCOUNTER: ICD-10-CM

## 2023-04-23 DIAGNOSIS — R11.2 NAUSEA AND VOMITING, UNSPECIFIED VOMITING TYPE: Primary | ICD-10-CM

## 2023-04-23 PROCEDURE — 99283 EMERGENCY DEPT VISIT LOW MDM: CPT

## 2023-04-24 NOTE — ED TRIAGE NOTES
Patient comes in by EMS stating that she took an unknown white pill for her headache. Patient states that she thought it was a Tylenol but she isn't sure. Patient states that she vomited 5 times prior to coming to the ER. Patient is drinking ice water in the waiting room and is keeping it down.

## 2023-04-24 NOTE — ED PROVIDER NOTES
EMERGENCY DEPARTMENT HISTORY AND PHYSICAL EXAM      Patient Name: Uriel Phillips  MRN: [id number]  YOB: 1995  Provider: Noble Jalloh MD  PCP: Telly Spicer MD   Time/Date of evaluation: 10:56 PM EDT on 4/23/23    History of Presenting Illness     Chief Complaint   Patient presents with    Anxiety    Emesis       History Provided By: Patient     History [place]):   Uriel Phillips is a 32 y.o. female with a PMHX of asthma, low platelets,  who presents to the emergency department  by EMS C/O nausea, vomiting and diaphoresis. The patient states that she drank a lot of alcohol last evening. This morning she woke up and had a headache. At that time she took a pill that she thought was Tylenol. However, shortly thereafter she began having nausea, vomiting and sweats. She then felt like she was going to pass out. At that point she called EMS. She states that she drinks alcohol 2 times per month. She denies daily drinking or any history of alcohol withdrawal.  She also denies taking any other drugs. This is an accidental ingestion. She states that she was just trying to treat a headache but not hurt herself in any way. Past History     Past Medical History:  Past Medical History:   Diagnosis Date    Asthma 1998    Dysmenorrhea in the adolescent 8/2/2011    Gonorrhea        Past Surgical History:  No past surgical history on file. Family History:  Family History   Problem Relation Age of Onset    Asthma Paternal Grandmother     Stroke Paternal Grandmother     Hypertension Paternal Grandmother     Diabetes Paternal Grandmother     Asthma Father     Asthma Brother     Asthma Paternal Uncle        Social History:  Social History     Tobacco Use    Smoking status: Never    Smokeless tobacco: Never   Substance Use Topics    Alcohol use: Not Currently    Drug use: No       Medications:  No current facility-administered medications for this encounter.      Current Outpatient Medications

## 2023-05-06 ENCOUNTER — HOSPITAL ENCOUNTER (EMERGENCY)
Facility: HOSPITAL | Age: 28
Discharge: HOME | End: 2023-05-06
Attending: STUDENT IN AN ORGANIZED HEALTH CARE EDUCATION/TRAINING PROGRAM
Payer: MEDICAID

## 2023-05-06 VITALS
RESPIRATION RATE: 16 BRPM | BODY MASS INDEX: 22.08 KG/M2 | TEMPERATURE: 98.3 F | WEIGHT: 120 LBS | HEIGHT: 62 IN | HEART RATE: 77 BPM | SYSTOLIC BLOOD PRESSURE: 98 MMHG | OXYGEN SATURATION: 97 % | DIASTOLIC BLOOD PRESSURE: 62 MMHG

## 2023-05-06 DIAGNOSIS — F10.120 HANGOVER WITHOUT COMPLICATION (HCC): Primary | ICD-10-CM

## 2023-05-06 PROCEDURE — 6360000002 HC RX W HCPCS: Performed by: STUDENT IN AN ORGANIZED HEALTH CARE EDUCATION/TRAINING PROGRAM

## 2023-05-06 PROCEDURE — 99284 EMERGENCY DEPT VISIT MOD MDM: CPT

## 2023-05-06 PROCEDURE — 96375 TX/PRO/DX INJ NEW DRUG ADDON: CPT

## 2023-05-06 PROCEDURE — 96374 THER/PROPH/DIAG INJ IV PUSH: CPT

## 2023-05-06 PROCEDURE — 96361 HYDRATE IV INFUSION ADD-ON: CPT

## 2023-05-06 PROCEDURE — 2580000003 HC RX 258: Performed by: STUDENT IN AN ORGANIZED HEALTH CARE EDUCATION/TRAINING PROGRAM

## 2023-05-06 RX ORDER — PROCHLORPERAZINE EDISYLATE 5 MG/ML
10 INJECTION INTRAMUSCULAR; INTRAVENOUS
Status: COMPLETED | OUTPATIENT
Start: 2023-05-06 | End: 2023-05-06

## 2023-05-06 RX ORDER — ONDANSETRON 2 MG/ML
4 INJECTION INTRAMUSCULAR; INTRAVENOUS
Status: COMPLETED | OUTPATIENT
Start: 2023-05-06 | End: 2023-05-06

## 2023-05-06 RX ORDER — 0.9 % SODIUM CHLORIDE 0.9 %
1000 INTRAVENOUS SOLUTION INTRAVENOUS ONCE
Status: COMPLETED | OUTPATIENT
Start: 2023-05-06 | End: 2023-05-06

## 2023-05-06 RX ADMIN — SODIUM CHLORIDE 1000 ML: 9 INJECTION, SOLUTION INTRAVENOUS at 15:51

## 2023-05-06 RX ADMIN — ONDANSETRON 4 MG: 2 INJECTION INTRAMUSCULAR; INTRAVENOUS at 15:51

## 2023-05-06 RX ADMIN — PROCHLORPERAZINE EDISYLATE 10 MG: 5 INJECTION INTRAMUSCULAR; INTRAVENOUS at 16:48

## 2023-05-06 ASSESSMENT — PAIN - FUNCTIONAL ASSESSMENT: PAIN_FUNCTIONAL_ASSESSMENT: NONE - DENIES PAIN

## 2023-05-06 NOTE — ED NOTES
EMERGENCY DEPARTMENT HISTORY AND PHYSICAL EXAM      Date: 5/6/2023  Patient Name: Rosendo Zarco [facility]    History of Presenting Illness     Chief Complaint   Patient presents with    Nausea    Emesis       32year old female presenting via EMS with complaints of headache and nausea. Admits to partying last night for [employer] and drank too much. Also admits to taking percocet last night. NBNB emesis all day today. No other complaints. PCP: Wileen Goltz, MD    No current facility-administered medications for this encounter. Current Outpatient Medications   Medication Sig Dispense Refill    albuterol (PROVENTIL) (5 MG/ML) 0.5% nebulizer solution Inhale into the lungs once         Past History     Past Medical History:  Past Medical History:   Diagnosis Date    Asthma 1998    Dysmenorrhea in the adolescent 8/2/2011    Gonorrhea        Past Surgical History:  No past surgical history on file. Family History:  Family History   Problem Relation Age of Onset    Asthma Paternal Grandmother     Stroke Paternal Grandmother     Hypertension Paternal Grandmother     Diabetes Paternal Grandmother     Asthma Father     Asthma Brother     Asthma Paternal Uncle        Social History:  Social History     Tobacco Use    Smoking status: Never    Smokeless tobacco: Never   Substance Use Topics    Alcohol use: Yes    Drug use: Yes     Types: Opiates        Allergies:  No Known Allergies      Review of Systems       Review of Systems   Constitutional:  Negative for activity change, fatigue and fever. Respiratory:  Negative for chest tightness and shortness of breath. Cardiovascular:  Negative for chest pain. Gastrointestinal:  Positive for nausea and vomiting. Negative for abdominal pain and diarrhea. Musculoskeletal:  Positive for myalgias. Negative for arthralgias. Skin:  Negative for rash and wound. Neurological:  Positive for headaches. Negative for dizziness, weakness, light-headedness and numbness.

## 2023-05-06 NOTE — ED TRIAGE NOTES
[place] EMS, [place] [address] [place] after drinking \"a lot\" yesterday and taking percocet.

## 2023-05-06 NOTE — ED NOTES
Discharge instructions reviewed with patient. Patient verbalized understanding. Patient advised to follow up as directed on discharge instructions. Patient denies questions, needs or concerns at this time. No s/sx of distress noted.         Jonathan García RN  05/06/23 5156

## 2023-05-07 ASSESSMENT — ENCOUNTER SYMPTOMS
VOMITING: 1
NAUSEA: 1
ABDOMINAL PAIN: 0
CHEST TIGHTNESS: 0
SHORTNESS OF BREATH: 0
DIARRHEA: 0

## 2023-09-04 ENCOUNTER — HOSPITAL ENCOUNTER (EMERGENCY)
Facility: HOSPITAL | Age: 28
Discharge: HOME | End: 2023-09-04
Attending: EMERGENCY MEDICINE
Payer: MEDICAID

## 2023-09-04 VITALS
WEIGHT: 115 LBS | HEIGHT: 62 IN | RESPIRATION RATE: 16 BRPM | BODY MASS INDEX: 21.16 KG/M2 | DIASTOLIC BLOOD PRESSURE: 68 MMHG | SYSTOLIC BLOOD PRESSURE: 99 MMHG | HEART RATE: 82 BPM | TEMPERATURE: 98.8 F | OXYGEN SATURATION: 100 %

## 2023-09-04 DIAGNOSIS — Z71.1 CONCERN ABOUT STD IN FEMALE WITHOUT DIAGNOSIS: Primary | ICD-10-CM

## 2023-09-04 LAB
APPEARANCE UR: CLEAR
BILIRUB UR QL: NEGATIVE
COLOR UR: YELLOW
EPITH CASTS URNS QL MICRO: NORMAL /LPF (ref 0–5)
GLUCOSE UR STRIP.AUTO-MCNC: NEGATIVE MG/DL
HCG UR QL: NEGATIVE
HGB UR QL STRIP: NEGATIVE
KETONES UR QL STRIP.AUTO: NEGATIVE MG/DL
LEUKOCYTE ESTERASE UR QL STRIP.AUTO: ABNORMAL
NITRITE UR QL STRIP.AUTO: NEGATIVE
PH UR STRIP: >8.5 [PH] (ref 5–8)
PROT UR STRIP-MCNC: ABNORMAL MG/DL
RBC #/AREA URNS HPF: NORMAL /HPF (ref 0–5)
SERVICE CMNT-IMP: NORMAL
SP GR UR REFRACTOMETRY: 1.03 (ref 1–1.03)
UROBILINOGEN UR QL STRIP.AUTO: 1 EU/DL (ref 0.2–1)
WBC URNS QL MICRO: NORMAL /HPF (ref 0–4)
WET PREP GENITAL: NORMAL

## 2023-09-04 PROCEDURE — 6360000002 HC RX W HCPCS: Performed by: PHYSICIAN ASSISTANT

## 2023-09-04 PROCEDURE — 99284 EMERGENCY DEPT VISIT MOD MDM: CPT

## 2023-09-04 PROCEDURE — 87491 CHLMYD TRACH DNA AMP PROBE: CPT

## 2023-09-04 PROCEDURE — 87210 SMEAR WET MOUNT SALINE/INK: CPT

## 2023-09-04 PROCEDURE — 96372 THER/PROPH/DIAG INJ SC/IM: CPT

## 2023-09-04 PROCEDURE — 81025 URINE PREGNANCY TEST: CPT

## 2023-09-04 PROCEDURE — 87591 N.GONORRHOEAE DNA AMP PROB: CPT

## 2023-09-04 PROCEDURE — 81001 URINALYSIS AUTO W/SCOPE: CPT

## 2023-09-04 PROCEDURE — 2500000003 HC RX 250 WO HCPCS: Performed by: PHYSICIAN ASSISTANT

## 2023-09-04 RX ORDER — DOXYCYCLINE HYCLATE 100 MG
100 TABLET ORAL 2 TIMES DAILY
Qty: 14 TABLET | Refills: 0 | Status: SHIPPED | OUTPATIENT
Start: 2023-09-04 | End: 2023-09-11

## 2023-09-04 RX ADMIN — LIDOCAINE HYDROCHLORIDE 500 MG: 10 INJECTION, SOLUTION EPIDURAL; INFILTRATION; INTRACAUDAL; PERINEURAL at 15:33

## 2023-09-04 ASSESSMENT — ENCOUNTER SYMPTOMS
ABDOMINAL DISTENTION: 0
NAUSEA: 0
VOMITING: 0
EYE DISCHARGE: 0
WHEEZING: 0
DIARRHEA: 0
SHORTNESS OF BREATH: 0
SORE THROAT: 0

## 2023-09-04 ASSESSMENT — PAIN - FUNCTIONAL ASSESSMENT: PAIN_FUNCTIONAL_ASSESSMENT: 0-10

## 2023-09-04 ASSESSMENT — LIFESTYLE VARIABLES
HOW OFTEN DO YOU HAVE A DRINK CONTAINING ALCOHOL: NEVER
HOW MANY STANDARD DRINKS CONTAINING ALCOHOL DO YOU HAVE ON A TYPICAL DAY: PATIENT DOES NOT DRINK

## 2023-09-04 ASSESSMENT — PAIN SCALES - GENERAL: PAINLEVEL_OUTOF10: 3

## 2023-09-04 NOTE — ED NOTES
Patient provided with discharge instructions and verbalized understanding.       Omar Benitez RN  09/04/23 5050

## 2023-09-04 NOTE — ED PROVIDER NOTES
EMERGENCY DEPARTMENT HISTORY AND PHYSICAL EXAM    Date: 9/4/2023  Patient Name: Lemuel Gastelum [facility]    History of Presenting Illness     Chief Complaint   Patient presents with    Exposure to STD     States partner was positive for ? History Provided By: Patient      Additional History (Context): Austin Johnson is a 32 y.o. female with a history of gonorrhea who presents today for concerns for STDs. Patient reports her only symptom is some pelvic discomfort. Denies any vaginal discharge or bleeding. Denies measurable fevers chills nausea or vomiting. Denies concern for pregnancy. Has not tried thing for this at home. Presenting today with her partner who is also requesting treatment. PCP: Candice Gale MD    Current Facility-Administered Medications   Medication Dose Route Frequency Provider Last Rate Last Admin    cefTRIAXone (ROCEPHIN) 500 mg in lidocaine 1 % 1.43 mL IM Injection  500 mg IntraMUSCular NOW HECTOR Nina         Current Outpatient Medications   Medication Sig Dispense Refill    doxycycline hyclate (VIBRA-TABS) 100 MG tablet Take 1 tablet by mouth 2 times daily for 7 days 14 tablet 0    albuterol (PROVENTIL) (5 MG/ML) 0.5% nebulizer solution Inhale into the lungs once         Past History     Past Medical History:  Past Medical History:   Diagnosis Date    Asthma 1998    Dysmenorrhea in the adolescent 8/2/2011    Gonorrhea        Past Surgical History:  No past surgical history on file.     Family History:  Family History   Problem Relation Age of Onset    Asthma Paternal Grandmother     Stroke Paternal Grandmother     Hypertension Paternal Grandmother     Diabetes Paternal Grandmother     Asthma Father     Asthma Brother     Asthma Paternal Uncle        Social History:  Social History     Tobacco Use    Smoking status: Never    Smokeless tobacco: Never   Substance Use Topics    Alcohol use: Yes    Drug use: Yes     Types: Opiates        Allergies:  No Known

## 2023-09-04 NOTE — ED TRIAGE NOTES
Patient states partner was positive for trichomonas and chlamydia, she believes. Patient only symptom is pain to pelvic are.

## 2023-09-16 ENCOUNTER — HOSPITAL ENCOUNTER (EMERGENCY)
Facility: HOSPITAL | Age: 28
Discharge: HOME | End: 2023-09-16
Payer: MEDICAID

## 2023-09-16 VITALS
SYSTOLIC BLOOD PRESSURE: 107 MMHG | DIASTOLIC BLOOD PRESSURE: 70 MMHG | TEMPERATURE: 98.4 F | OXYGEN SATURATION: 99 % | RESPIRATION RATE: 28 BRPM | HEART RATE: 89 BPM

## 2023-09-16 DIAGNOSIS — H53.8 BLURRY VISION, BILATERAL: Primary | ICD-10-CM

## 2023-09-16 LAB
AMPHET UR QL SCN: NEGATIVE
ANION GAP SERPL CALC-SCNC: 6 MMOL/L (ref 3–18)
APPEARANCE UR: CLEAR
BARBITURATES UR QL SCN: NEGATIVE
BASOPHILS # BLD: 0.1 K/UL (ref 0–0.1)
BASOPHILS NFR BLD: 1 % (ref 0–2)
BENZODIAZ UR QL: NEGATIVE
BILIRUB UR QL: NEGATIVE
BUN SERPL-MCNC: 11 MG/DL (ref 7–18)
BUN/CREAT SERPL: 15 (ref 12–20)
CALCIUM SERPL-MCNC: 8.7 MG/DL (ref 8.5–10.1)
CANNABINOIDS UR QL SCN: NEGATIVE
CHLORIDE SERPL-SCNC: 108 MMOL/L (ref 100–111)
CO2 SERPL-SCNC: 25 MMOL/L (ref 21–32)
COCAINE UR QL SCN: NEGATIVE
COLOR UR: YELLOW
CREAT SERPL-MCNC: 0.71 MG/DL (ref 0.6–1.3)
DIFFERENTIAL METHOD BLD: ABNORMAL
EOSINOPHIL # BLD: 0.1 K/UL (ref 0–0.4)
EOSINOPHIL NFR BLD: 1 % (ref 0–5)
ERYTHROCYTE [DISTWIDTH] IN BLOOD BY AUTOMATED COUNT: 13.1 % (ref 11.6–14.5)
ETHANOL SERPL-MCNC: <3 MG/DL (ref 0–3)
GLUCOSE SERPL-MCNC: 133 MG/DL (ref 74–99)
GLUCOSE UR STRIP.AUTO-MCNC: NEGATIVE MG/DL
HCT VFR BLD AUTO: 34.8 % (ref 35–45)
HGB BLD-MCNC: 11.6 G/DL (ref 12–16)
HGB UR QL STRIP: NEGATIVE
IMM GRANULOCYTES # BLD AUTO: 0 K/UL (ref 0–0.04)
IMM GRANULOCYTES NFR BLD AUTO: 0 % (ref 0–0.5)
KETONES UR QL STRIP.AUTO: NEGATIVE MG/DL
LEUKOCYTE ESTERASE UR QL STRIP.AUTO: NEGATIVE
LYMPHOCYTES # BLD: 4.5 K/UL (ref 0.9–3.6)
LYMPHOCYTES NFR BLD: 52 % (ref 21–52)
Lab: ABNORMAL
MCH RBC QN AUTO: 30.1 PG (ref 24–34)
MCHC RBC AUTO-ENTMCNC: 33.3 G/DL (ref 31–37)
MCV RBC AUTO: 90.4 FL (ref 78–100)
METHADONE UR QL: NEGATIVE
MONOCYTES # BLD: 0.4 K/UL (ref 0.05–1.2)
MONOCYTES NFR BLD: 5 % (ref 3–10)
NEUTS SEG # BLD: 3.5 K/UL (ref 1.8–8)
NEUTS SEG NFR BLD: 41 % (ref 40–73)
NITRITE UR QL STRIP.AUTO: NEGATIVE
NRBC # BLD: 0 K/UL (ref 0–0.01)
NRBC BLD-RTO: 0 PER 100 WBC
OPIATES UR QL: POSITIVE
PCP UR QL: NEGATIVE
PH UR STRIP: 5.5 (ref 5–8)
PLATELET # BLD AUTO: 127 K/UL (ref 135–420)
PLATELET COMMENT: ABNORMAL
PMV BLD AUTO: 12.5 FL (ref 9.2–11.8)
POTASSIUM SERPL-SCNC: 3.3 MMOL/L (ref 3.5–5.5)
PROT UR STRIP-MCNC: NEGATIVE MG/DL
RBC # BLD AUTO: 3.85 M/UL (ref 4.2–5.3)
RBC MORPH BLD: ABNORMAL
SODIUM SERPL-SCNC: 139 MMOL/L (ref 136–145)
SP GR UR REFRACTOMETRY: 1.01 (ref 1–1.03)
UROBILINOGEN UR QL STRIP.AUTO: 0.2 EU/DL (ref 0.2–1)
WBC # BLD AUTO: 8.6 K/UL (ref 4.6–13.2)

## 2023-09-16 PROCEDURE — 82077 ASSAY SPEC XCP UR&BREATH IA: CPT

## 2023-09-16 PROCEDURE — 99284 EMERGENCY DEPT VISIT MOD MDM: CPT

## 2023-09-16 PROCEDURE — 81003 URINALYSIS AUTO W/O SCOPE: CPT

## 2023-09-16 PROCEDURE — 85025 COMPLETE CBC W/AUTO DIFF WBC: CPT

## 2023-09-16 PROCEDURE — 96374 THER/PROPH/DIAG INJ IV PUSH: CPT

## 2023-09-16 PROCEDURE — 6370000000 HC RX 637 (ALT 250 FOR IP): Performed by: HEALTH CARE PROVIDER

## 2023-09-16 PROCEDURE — 80048 BASIC METABOLIC PNL TOTAL CA: CPT

## 2023-09-16 PROCEDURE — 80307 DRUG TEST PRSMV CHEM ANLYZR: CPT

## 2023-09-16 PROCEDURE — 6360000002 HC RX W HCPCS: Performed by: HEALTH CARE PROVIDER

## 2023-09-16 RX ORDER — ONDANSETRON 2 MG/ML
4 INJECTION INTRAMUSCULAR; INTRAVENOUS
Status: COMPLETED | OUTPATIENT
Start: 2023-09-16 | End: 2023-09-16

## 2023-09-16 RX ADMIN — ONDANSETRON 4 MG: 2 INJECTION INTRAMUSCULAR; INTRAVENOUS at 02:24

## 2023-09-16 RX ADMIN — POTASSIUM BICARBONATE 20 MEQ: 782 TABLET, EFFERVESCENT ORAL at 02:23

## 2023-09-16 ASSESSMENT — ENCOUNTER SYMPTOMS
RHINORRHEA: 0
SHORTNESS OF BREATH: 0
EYE REDNESS: 0
SINUS PRESSURE: 0
TROUBLE SWALLOWING: 0
CHEST TIGHTNESS: 0
SINUS PAIN: 0
EYE ITCHING: 0
SORE THROAT: 0
EYE DISCHARGE: 0
PHOTOPHOBIA: 0
EYE PAIN: 0

## 2023-09-16 NOTE — ED PROVIDER NOTES
throughout, strength 5/5 throughout     Gait:  No truncal ataxia. Patient describes transient numbness in left leg/foot, left hand.    Psychiatric:      Comments: Somewhat anxious, distracted by phone conversations        Vitals:    09/16/23 0245   BP: 107/70   Pulse: 89   Resp: 28   Temp:    SpO2:          Diagnostic Study Results     Labs -     Recent Results (from the past 12 hour(s))   CBC with Auto Differential    Collection Time: 09/16/23  1:40 AM   Result Value Ref Range    WBC 8.6 4.6 - 13.2 K/uL    RBC 3.85 (L) 4.20 - 5.30 M/uL    Hemoglobin 11.6 (L) 12.0 - 16.0 g/dL    Hematocrit 34.8 (L) 35.0 - 45.0 %    MCV 90.4 78.0 - 100.0 FL    MCH 30.1 24.0 - 34.0 PG    MCHC 33.3 31.0 - 37.0 g/dL    RDW 13.1 11.6 - 14.5 %    Platelets 648 (L) 264 - 420 K/uL    MPV 12.5 (H) 9.2 - 11.8 FL    Nucleated RBCs 0.0 0  WBC    nRBC 0.00 0.00 - 0.01 K/uL    Neutrophils % PENDING %    Lymphocytes % PENDING %    Monocytes % PENDING %    Eosinophils % PENDING %    Basophils % PENDING %    Immature Granulocytes PENDING %    Neutrophils Absolute PENDING K/UL    Lymphocytes Absolute PENDING K/UL    Monocytes Absolute PENDING K/UL    Eosinophils Absolute PENDING K/UL    Basophils Absolute PENDING K/UL    Absolute Immature Granulocyte PENDING K/UL    Differential Type PENDING    Basic Metabolic Panel    Collection Time: 09/16/23  1:40 AM   Result Value Ref Range    Sodium 139 136 - 145 mmol/L    Potassium 3.3 (L) 3.5 - 5.5 mmol/L    Chloride 108 100 - 111 mmol/L    CO2 25 21 - 32 mmol/L    Anion Gap 6 3.0 - 18 mmol/L    Glucose 133 (H) 74 - 99 mg/dL    BUN 11 7.0 - 18 MG/DL    Creatinine 0.71 0.6 - 1.3 MG/DL    Bun/Cre Ratio 15 12 - 20      Est, Glom Filt Rate >60 >60 ml/min/1.73m2    Calcium 8.7 8.5 - 10.1 MG/DL   Ethanol    Collection Time: 09/16/23  1:40 AM   Result Value Ref Range    Ethanol Lvl <3 0 - 3 MG/DL       Radiologic Studies -   No orders to display         SCREENINGS     NIH Stroke Scale  Level of

## 2023-09-16 NOTE — ED TRIAGE NOTES
Patient comes from home via EMS c/o blurry vision in bilateral eyes stated it's like she is drunk with blurry vision   Patient states she took an oxycodone for dental pain that she has been taking for 2 days. Patient is alert and ambulatory arrives via EMS with her two small children.

## 2023-09-16 NOTE — ED NOTES
Patient discharged by provider  Discharge instructions explained. IV removed.  Medicated per [facility]     Rosalba Joseph RN  09/16/23 2098

## 2023-10-11 ASSESSMENT — ENCOUNTER SYMPTOMS: SHORTNESS OF BREATH: 0

## 2023-10-12 ENCOUNTER — OFFICE VISIT (OUTPATIENT)
Facility: CLINIC | Age: 28
End: 2023-10-12
Payer: MEDICAID

## 2023-10-12 VITALS
HEIGHT: 62 IN | WEIGHT: 116 LBS | DIASTOLIC BLOOD PRESSURE: 79 MMHG | OXYGEN SATURATION: 100 % | RESPIRATION RATE: 16 BRPM | SYSTOLIC BLOOD PRESSURE: 118 MMHG | BODY MASS INDEX: 21.35 KG/M2 | HEART RATE: 80 BPM | TEMPERATURE: 97.3 F

## 2023-10-12 DIAGNOSIS — G43.009 MIGRAINE WITHOUT AURA AND WITHOUT STATUS MIGRAINOSUS, NOT INTRACTABLE: ICD-10-CM

## 2023-10-12 DIAGNOSIS — Z11.4 SCREENING FOR HIV (HUMAN IMMUNODEFICIENCY VIRUS): ICD-10-CM

## 2023-10-12 DIAGNOSIS — N92.6 IRREGULAR PERIODS: ICD-10-CM

## 2023-10-12 DIAGNOSIS — Z11.59 NEED FOR HEPATITIS C SCREENING TEST: ICD-10-CM

## 2023-10-12 DIAGNOSIS — H53.8 BLURRY VISION: Primary | ICD-10-CM

## 2023-10-12 DIAGNOSIS — F33.1 MODERATE EPISODE OF RECURRENT MAJOR DEPRESSIVE DISORDER (HCC): ICD-10-CM

## 2023-10-12 DIAGNOSIS — Z13.220 SCREENING FOR LIPID DISORDERS: ICD-10-CM

## 2023-10-12 DIAGNOSIS — Z76.89 ENCOUNTER TO ESTABLISH CARE: ICD-10-CM

## 2023-10-12 DIAGNOSIS — H53.8 BLURRY VISION: ICD-10-CM

## 2023-10-12 PROCEDURE — 99205 OFFICE O/P NEW HI 60 MIN: CPT

## 2023-10-12 SDOH — ECONOMIC STABILITY: FOOD INSECURITY: WITHIN THE PAST 12 MONTHS, YOU WORRIED THAT YOUR FOOD WOULD RUN OUT BEFORE YOU GOT MONEY TO BUY MORE.: NEVER TRUE

## 2023-10-12 SDOH — ECONOMIC STABILITY: FOOD INSECURITY: WITHIN THE PAST 12 MONTHS, THE FOOD YOU BOUGHT JUST DIDN'T LAST AND YOU DIDN'T HAVE MONEY TO GET MORE.: NEVER TRUE

## 2023-10-12 SDOH — ECONOMIC STABILITY: INCOME INSECURITY: HOW HARD IS IT FOR YOU TO PAY FOR THE VERY BASICS LIKE FOOD, HOUSING, MEDICAL CARE, AND HEATING?: HARD

## 2023-10-12 SDOH — ECONOMIC STABILITY: HOUSING INSECURITY
IN THE LAST 12 MONTHS, WAS THERE A TIME WHEN YOU DID NOT HAVE A STEADY PLACE TO SLEEP OR SLEPT IN A SHELTER (INCLUDING NOW)?: NO

## 2023-10-12 ASSESSMENT — ANXIETY QUESTIONNAIRES
1. FEELING NERVOUS, ANXIOUS, OR ON EDGE: 3
6. BECOMING EASILY ANNOYED OR IRRITABLE: 3
5. BEING SO RESTLESS THAT IT IS HARD TO SIT STILL: 1
3. WORRYING TOO MUCH ABOUT DIFFERENT THINGS: 3
2. NOT BEING ABLE TO STOP OR CONTROL WORRYING: 3
4. TROUBLE RELAXING: 3
GAD7 TOTAL SCORE: 17
7. FEELING AFRAID AS IF SOMETHING AWFUL MIGHT HAPPEN: 1
IF YOU CHECKED OFF ANY PROBLEMS ON THIS QUESTIONNAIRE, HOW DIFFICULT HAVE THESE PROBLEMS MADE IT FOR YOU TO DO YOUR WORK, TAKE CARE OF THINGS AT HOME, OR GET ALONG WITH OTHER PEOPLE: SOMEWHAT DIFFICULT

## 2023-10-12 ASSESSMENT — PATIENT HEALTH QUESTIONNAIRE - PHQ9
8. MOVING OR SPEAKING SO SLOWLY THAT OTHER PEOPLE COULD HAVE NOTICED. OR THE OPPOSITE, BEING SO FIGETY OR RESTLESS THAT YOU HAVE BEEN MOVING AROUND A LOT MORE THAN USUAL: 2
1. LITTLE INTEREST OR PLEASURE IN DOING THINGS: 1
4. FEELING TIRED OR HAVING LITTLE ENERGY: 3
3. TROUBLE FALLING OR STAYING ASLEEP: 2
SUM OF ALL RESPONSES TO PHQ QUESTIONS 1-9: 13
SUM OF ALL RESPONSES TO PHQ QUESTIONS 1-9: 13
5. POOR APPETITE OR OVEREATING: 3
7. TROUBLE CONCENTRATING ON THINGS, SUCH AS READING THE NEWSPAPER OR WATCHING TELEVISION: 1
SUM OF ALL RESPONSES TO PHQ QUESTIONS 1-9: 13
10. IF YOU CHECKED OFF ANY PROBLEMS, HOW DIFFICULT HAVE THESE PROBLEMS MADE IT FOR YOU TO DO YOUR WORK, TAKE CARE OF THINGS AT HOME, OR GET ALONG WITH OTHER PEOPLE: 1
9. THOUGHTS THAT YOU WOULD BE BETTER OFF DEAD, OR OF HURTING YOURSELF: 0
6. FEELING BAD ABOUT YOURSELF - OR THAT YOU ARE A FAILURE OR HAVE LET YOURSELF OR YOUR FAMILY DOWN: 0
SUM OF ALL RESPONSES TO PHQ9 QUESTIONS 1 & 2: 2
2. FEELING DOWN, DEPRESSED OR HOPELESS: 1
SUM OF ALL RESPONSES TO PHQ QUESTIONS 1-9: 13

## 2023-10-12 ASSESSMENT — ENCOUNTER SYMPTOMS: PHOTOPHOBIA: 1

## 2023-10-12 NOTE — ASSESSMENT & PLAN NOTE
Check CBC to r/o anemia  Check TSH to r/o thyroid disorder  Advised pt to f/u with gynecology for management

## 2023-10-12 NOTE — ASSESSMENT & PLAN NOTE
Neuro exam within normal limits  Continue management per ophthalmology  Referral to neurology for management  Check CMP to r/o electrolyte imbalance

## 2023-10-12 NOTE — ASSESSMENT & PLAN NOTE
Discussed management options including medication management, counseling/therapy, referral to psychiatry  Re-evaluate in 2 weeks

## 2023-10-19 DIAGNOSIS — H53.8 BLURRY VISION: Primary | ICD-10-CM

## 2023-10-19 DIAGNOSIS — G43.009 MIGRAINE WITHOUT AURA AND WITHOUT STATUS MIGRAINOSUS, NOT INTRACTABLE: ICD-10-CM

## 2024-01-15 ENCOUNTER — HOSPITAL ENCOUNTER (EMERGENCY)
Facility: HOSPITAL | Age: 29
Discharge: HOME | End: 2024-01-15
Attending: STUDENT IN AN ORGANIZED HEALTH CARE EDUCATION/TRAINING PROGRAM
Payer: MEDICAID

## 2024-01-15 VITALS
SYSTOLIC BLOOD PRESSURE: 114 MMHG | HEART RATE: 69 BPM | TEMPERATURE: 98.3 F | OXYGEN SATURATION: 99 % | DIASTOLIC BLOOD PRESSURE: 69 MMHG | BODY MASS INDEX: 23.37 KG/M2 | RESPIRATION RATE: 16 BRPM | HEIGHT: 62 IN | WEIGHT: 127 LBS

## 2024-01-15 DIAGNOSIS — A59.00 GU INFECTION, TRICHOMONAL: ICD-10-CM

## 2024-01-15 DIAGNOSIS — K13.4: Primary | ICD-10-CM

## 2024-01-15 LAB — HCG UR QL: NEGATIVE

## 2024-01-15 PROCEDURE — 81025 URINE PREGNANCY TEST: CPT

## 2024-01-15 PROCEDURE — 99283 EMERGENCY DEPT VISIT LOW MDM: CPT

## 2024-01-15 PROCEDURE — 6370000000 HC RX 637 (ALT 250 FOR IP): Performed by: STUDENT IN AN ORGANIZED HEALTH CARE EDUCATION/TRAINING PROGRAM

## 2024-01-15 RX ORDER — FLUCONAZOLE 150 MG/1
150 TABLET ORAL ONCE
Qty: 1 TABLET | Refills: 0 | Status: SHIPPED | OUTPATIENT
Start: 2024-01-15 | End: 2024-01-15

## 2024-01-15 RX ORDER — METRONIDAZOLE 500 MG/1
500 TABLET ORAL
Status: COMPLETED | OUTPATIENT
Start: 2024-01-15 | End: 2024-01-15

## 2024-01-15 RX ORDER — METRONIDAZOLE 500 MG/1
500 TABLET ORAL 2 TIMES DAILY
Qty: 14 TABLET | Refills: 0 | Status: SHIPPED | OUTPATIENT
Start: 2024-01-15 | End: 2024-01-22

## 2024-01-15 RX ADMIN — METRONIDAZOLE 500 MG: 500 TABLET ORAL at 20:37

## 2024-01-15 ASSESSMENT — LIFESTYLE VARIABLES
HOW MANY STANDARD DRINKS CONTAINING ALCOHOL DO YOU HAVE ON A TYPICAL DAY: 1 OR 2
HOW OFTEN DO YOU HAVE A DRINK CONTAINING ALCOHOL: MONTHLY OR LESS

## 2024-01-15 ASSESSMENT — PAIN - FUNCTIONAL ASSESSMENT: PAIN_FUNCTIONAL_ASSESSMENT: NONE - DENIES PAIN

## 2024-01-16 NOTE — ED TRIAGE NOTES
Pt states that she has a recurring upper frontal dental abscess that is hurting. She was Dx with Trichomonas a week ago at [facility] but was unable to go back for treatment so she is requesting STD check and treatment. She has vaginal discharge with a foul odor and dysuria.

## 2024-01-16 NOTE — ED PROVIDER NOTES
done found to be negative.    Patient stable for discharge at this time.  Patient is in agreement with the plan to be discharged at this time.  All the patient's questions were answered.  Patient was given written instructions on the diagnosis, and states understanding of the plan moving forward.  We did discuss important signs and symptoms that should prompt quick return to the emergency department.           CRITICAL CARE TIME   Total Critical Care time was 0 minutes, excluding separately reportable procedures.  There was a high probability of clinically significant/life threatening deterioration in the patient's condition which required my urgent intervention.     CONSULTS:  None    PROCEDURES:  Unless otherwise noted below, none     Procedures      FINAL IMPRESSION      1. Pyogenic granuloma oral mucosa    2.  infection, trichomonal          DISPOSITION/PLAN   DISPOSITION Decision To Discharge 01/15/2024 08:22:34 PM      PATIENT REFERRED TO:  [facility]  [address]  [address]  [place] [address]  [phone]    for further std testing    Clemencia Ross, NP-C  [address] [address]  [phone]      As needed    your dentist    Schedule an appointment as soon as possible for a visit         DISCHARGE MEDICATIONS:  New Prescriptions    METRONIDAZOLE (FLAGYL) 500 MG TABLET    Take 1 tablet by mouth 2 times daily for 7 days     Controlled Substances Monitoring:          No data to display                (Please note that portions of this note were completed with a voice recognition program.  Efforts were made to edit the dictations but occasionally words are mis-transcribed.)    Bonnie Stark MD (electronically signed)  Attending Emergency Physician            Bonnie Stark MD  01/15/24 2023

## 2024-01-16 NOTE — DISCHARGE INSTRUCTIONS
Your oral lesion is consistent with a pyogenic granuloma.  These generally do not require treatment and they are not a true infection however if it continues to bother you contact your dentist about having it removed.    Follow-up with one of the provided dental clinics below:    [facility] [phone]  [facility] [phone]  [facility] [phone]  [facility] [phone]  [facility] [phone]  [facility] [phone]    Call to schedule an appointment.

## 2024-02-05 ENCOUNTER — HOSPITAL ENCOUNTER (EMERGENCY)
Facility: HOSPITAL | Age: 29
Discharge: HOME | End: 2024-02-05
Attending: EMERGENCY MEDICINE
Payer: MEDICAID

## 2024-02-05 VITALS
RESPIRATION RATE: 16 BRPM | HEART RATE: 81 BPM | WEIGHT: 130 LBS | SYSTOLIC BLOOD PRESSURE: 123 MMHG | HEIGHT: 62 IN | OXYGEN SATURATION: 98 % | TEMPERATURE: 98.2 F | DIASTOLIC BLOOD PRESSURE: 58 MMHG | BODY MASS INDEX: 23.92 KG/M2

## 2024-02-05 DIAGNOSIS — Z20.2 POSSIBLE EXPOSURE TO STD: Primary | ICD-10-CM

## 2024-02-05 LAB
APPEARANCE UR: ABNORMAL
BILIRUB UR QL: NEGATIVE
COLOR UR: YELLOW
EPITH CASTS URNS QL MICRO: NORMAL /LPF (ref 0–5)
GLUCOSE UR STRIP.AUTO-MCNC: NEGATIVE MG/DL
HCG UR QL: NEGATIVE
HGB UR QL STRIP: NEGATIVE
KETONES UR QL STRIP.AUTO: NEGATIVE MG/DL
LEUKOCYTE ESTERASE UR QL STRIP.AUTO: ABNORMAL
NITRITE UR QL STRIP.AUTO: NEGATIVE
PH UR STRIP: 5.5 (ref 5–8)
PROT UR STRIP-MCNC: NEGATIVE MG/DL
RBC #/AREA URNS HPF: NORMAL /HPF (ref 0–5)
SP GR UR REFRACTOMETRY: >1.03 (ref 1–1.03)
UROBILINOGEN UR QL STRIP.AUTO: 1 EU/DL (ref 0.2–1)
WBC URNS QL MICRO: NORMAL /HPF (ref 0–4)

## 2024-02-05 PROCEDURE — 81001 URINALYSIS AUTO W/SCOPE: CPT

## 2024-02-05 PROCEDURE — 87661 TRICHOMONAS VAGINALIS AMPLIF: CPT

## 2024-02-05 PROCEDURE — 6360000002 HC RX W HCPCS

## 2024-02-05 PROCEDURE — 81025 URINE PREGNANCY TEST: CPT

## 2024-02-05 PROCEDURE — 2500000003 HC RX 250 WO HCPCS

## 2024-02-05 PROCEDURE — 96372 THER/PROPH/DIAG INJ SC/IM: CPT

## 2024-02-05 PROCEDURE — 99284 EMERGENCY DEPT VISIT MOD MDM: CPT

## 2024-02-05 PROCEDURE — 6370000000 HC RX 637 (ALT 250 FOR IP): Performed by: EMERGENCY MEDICINE

## 2024-02-05 PROCEDURE — 87591 N.GONORRHOEAE DNA AMP PROB: CPT

## 2024-02-05 PROCEDURE — 87491 CHLMYD TRACH DNA AMP PROBE: CPT

## 2024-02-05 RX ORDER — DOXYCYCLINE HYCLATE 100 MG
100 TABLET ORAL 2 TIMES DAILY
Qty: 14 TABLET | Refills: 0 | Status: SHIPPED | OUTPATIENT
Start: 2024-02-05 | End: 2024-02-12

## 2024-02-05 RX ORDER — DOXYCYCLINE HYCLATE 100 MG/1
100 CAPSULE ORAL EVERY 12 HOURS SCHEDULED
Status: CANCELLED | OUTPATIENT
Start: 2024-02-05 | End: 2024-02-12

## 2024-02-05 RX ORDER — DOXYCYCLINE HYCLATE 100 MG/1
100 CAPSULE ORAL
Status: COMPLETED | OUTPATIENT
Start: 2024-02-05 | End: 2024-02-05

## 2024-02-05 RX ADMIN — CEFTRIAXONE SODIUM 500 MG: 500 INJECTION, POWDER, FOR SOLUTION INTRAMUSCULAR; INTRAVENOUS at 14:44

## 2024-02-05 RX ADMIN — DOXYCYCLINE HYCLATE 100 MG: 100 CAPSULE ORAL at 14:44

## 2024-02-05 ASSESSMENT — LIFESTYLE VARIABLES
HOW OFTEN DO YOU HAVE A DRINK CONTAINING ALCOHOL: MONTHLY OR LESS
HOW MANY STANDARD DRINKS CONTAINING ALCOHOL DO YOU HAVE ON A TYPICAL DAY: 1 OR 2

## 2024-02-05 ASSESSMENT — PAIN SCALES - GENERAL
PAINLEVEL_OUTOF10: 4
PAINLEVEL_OUTOF10: 0

## 2024-02-05 ASSESSMENT — ENCOUNTER SYMPTOMS
SHORTNESS OF BREATH: 0
NAUSEA: 0
SORE THROAT: 0
ABDOMINAL PAIN: 0
COUGH: 0

## 2024-02-05 ASSESSMENT — PAIN - FUNCTIONAL ASSESSMENT
PAIN_FUNCTIONAL_ASSESSMENT: 0-10
PAIN_FUNCTIONAL_ASSESSMENT: 0-10

## 2024-02-05 NOTE — ED TRIAGE NOTES
Patient wants to be rechecked for trichomonas because she is still having discharge and now is having bilateral lower back pain, rating 4/10, complains of dysuria, denies hematuria.

## 2024-02-05 NOTE — ED PROVIDER NOTES
Patient seen by Dr. Perez, please refer to their note for assessment and plan.     Meir Real PA-C  02/05/24 8061    
2024 1334 By record review, patient was seen at a [facility] facility on 12/26 and did have trichomonas in her urine. [SH]   1338 Checking UA, urine gonorrhea, chlamydia, trich, and HCG [RB]   1338 No CVA tenderness. No abd tenderness. A febrile. Nonacute exam. Low suspicion for PID at this time. [RB]   1358 Chlamydia, Gonorrhea, Trichomoniasis [RB]   1405 UA not convincing for UTI, although does have small leuk est. Likely from vaginal discharge.   Awaiting pregnancy test and g/c/trich. If negative, give rocephin  mg x 1 to cover for gonorrhea, start 7 day course of doxycycline 100 mg BID to cover for chlamydia. Will treat for trich pending results of her test in a few days. Of note, she did not finish her flagyl rx'd 2 wks ago. [RB]   1445 Urine HCG negative. Stable for discharge w/ doxycycline 100 mg BID x 1 wk w/ rest of plan as above. Advised to f/u w/ PCP to discuss thorough STI screening and contraception. All questions answered. [RB]      ED Course User Index  [RB] Paula Sotelo,   [SH] Nayan Perez MD       Provider Notes (Medical Decision Making):   MDM     Amount and/or Complexity of Data Reviewed  Clinical lab tests: ordered and reviewed  Tests in the radiology section of CPT®: ordered and reviewed  Tests in the medicine section of CPT®: ordered and reviewed  Discussion of test results with the performing providers: yes  Decide to obtain previous medical records or to obtain history from someone other than the patient: yes  Obtain history from someone other than the patient: no  Review and summarize past medical records: yes  Independent visualization of images, tracings, or specimens: yes    Risk of Complications, Morbidity, and/or Mortality  Presenting problems: low  Diagnostic procedures: low  Management options: low    Patient Progress  Patient progress: stable        Procedures - none    Critical Care Time:       Diagnosis     Clinical Impression:   1. Possible exposure to STD

## 2024-02-05 NOTE — DISCHARGE INSTRUCTIONS
Take medication as prescribed with full meal. Avoid any sexual activity for at least 2 wks or longer, no sex with current symptoms.     Talk to your PCP about safe sex and contraception.  You must have a complete STD workup through your primary care provider including HIV testing    Return to ED if you have a fever of 100.4 or more, severe abdominal pain, chest pain shortness of breath, severe back pain, or you are unable to urinate.

## 2024-02-06 LAB
C TRACH RRNA SPEC QL NAA+PROBE: NEGATIVE
N GONORRHOEA RRNA SPEC QL NAA+PROBE: NEGATIVE
SPECIMEN SOURCE: NORMAL
T VAGINALIS RRNA SPEC QL NAA+PROBE: NEGATIVE

## 2024-03-05 ENCOUNTER — HOSPITAL ENCOUNTER (EMERGENCY)
Facility: HOSPITAL | Age: 29
Discharge: HOME | End: 2024-03-05
Attending: STUDENT IN AN ORGANIZED HEALTH CARE EDUCATION/TRAINING PROGRAM
Payer: MEDICAID

## 2024-03-05 VITALS
RESPIRATION RATE: 17 BRPM | OXYGEN SATURATION: 100 % | DIASTOLIC BLOOD PRESSURE: 69 MMHG | SYSTOLIC BLOOD PRESSURE: 120 MMHG | BODY MASS INDEX: 23.92 KG/M2 | HEART RATE: 107 BPM | WEIGHT: 130 LBS | TEMPERATURE: 97.6 F | HEIGHT: 62 IN

## 2024-03-05 DIAGNOSIS — Z3A.01 LESS THAN 8 WEEKS GESTATION OF PREGNANCY: ICD-10-CM

## 2024-03-05 DIAGNOSIS — Z20.2 STD EXPOSURE: Primary | ICD-10-CM

## 2024-03-05 LAB — HCG UR QL: ABNORMAL

## 2024-03-05 PROCEDURE — 99284 EMERGENCY DEPT VISIT MOD MDM: CPT

## 2024-03-05 PROCEDURE — 2500000003 HC RX 250 WO HCPCS: Performed by: STUDENT IN AN ORGANIZED HEALTH CARE EDUCATION/TRAINING PROGRAM

## 2024-03-05 PROCEDURE — 6360000002 HC RX W HCPCS: Performed by: STUDENT IN AN ORGANIZED HEALTH CARE EDUCATION/TRAINING PROGRAM

## 2024-03-05 PROCEDURE — 87491 CHLMYD TRACH DNA AMP PROBE: CPT

## 2024-03-05 PROCEDURE — 87591 N.GONORRHOEAE DNA AMP PROB: CPT

## 2024-03-05 PROCEDURE — 87661 TRICHOMONAS VAGINALIS AMPLIF: CPT

## 2024-03-05 PROCEDURE — 96372 THER/PROPH/DIAG INJ SC/IM: CPT

## 2024-03-05 PROCEDURE — 81025 URINE PREGNANCY TEST: CPT

## 2024-03-05 RX ORDER — AZITHROMYCIN 500 MG/1
1000 TABLET, FILM COATED ORAL
Status: DISCONTINUED | OUTPATIENT
Start: 2024-03-05 | End: 2024-03-05 | Stop reason: HOSPADM

## 2024-03-05 RX ORDER — METRONIDAZOLE 500 MG/1
500 TABLET ORAL 2 TIMES DAILY
Qty: 14 TABLET | Refills: 0 | Status: SHIPPED | OUTPATIENT
Start: 2024-03-05 | End: 2024-03-12

## 2024-03-05 RX ORDER — AZITHROMYCIN 500 MG/1
1000 TABLET, FILM COATED ORAL DAILY
Qty: 2 TABLET | Refills: 0 | Status: SHIPPED | OUTPATIENT
Start: 2024-03-05 | End: 2024-03-06

## 2024-03-05 RX ADMIN — LIDOCAINE HYDROCHLORIDE 500 MG: 10 INJECTION, SOLUTION EPIDURAL; INFILTRATION; INTRACAUDAL; PERINEURAL at 14:29

## 2024-03-05 ASSESSMENT — ENCOUNTER SYMPTOMS
SHORTNESS OF BREATH: 0
VOMITING: 0
COUGH: 0
NAUSEA: 0
DIARRHEA: 0

## 2024-03-05 ASSESSMENT — PAIN - FUNCTIONAL ASSESSMENT: PAIN_FUNCTIONAL_ASSESSMENT: NONE - DENIES PAIN

## 2024-03-05 NOTE — ED TRIAGE NOTES
Ambulatory pt c/o exposure to trich. Pt reports being seen and treated but not receiving results. Pt would like to be re-screened and know her previous results. Denies symptoms. Reports being 1 week late on period and would like pregnancy test

## 2024-03-05 NOTE — ED PROVIDER NOTES
[facility] EMERGENCY DEPT  EMERGENCY DEPARTMENT ENCOUNTER     Pt Name: Catalina Minaya  MRN: [id number]  Birthdate 1995  Date of evaluation: 3/5/2024  Provider: Lokesh Rodarte MD  PCP: Clemencia Ross NP-C  Note Started: 2:52 PM EST 3/5/24    Chief Complaint  Exposure to STD      History of Present Illness  Catalina Minaya is a 28 y.o. female who presents to the ED with a chief complaint of possible sleep exposure.  Patient reports she believes her partner had a STD exposure.  Patient is looking for testing, and believes she is pregnant because she has been 1 week late on her period.  Patient has no other symptoms, denies dysuria, frequency, pain with sex, vaginal discharge.    Medical History  Past Medical History:   Diagnosis Date    Asthma 1998    Dysmenorrhea in the adolescent 8/2/2011    Gonorrhea      History reviewed. No pertinent surgical history.  Family History   Problem Relation Age of Onset    Asthma Paternal Grandmother     Stroke Paternal Grandmother     Hypertension Paternal Grandmother     Diabetes Paternal Grandmother     Asthma Father     Asthma Brother     Asthma Paternal Uncle      Social History     Social History Narrative    Not on file      Social History     Tobacco Use    Smoking status: Every Day     Types: Cigarettes, Cigars    Smokeless tobacco: Never   Vaping Use    Vaping Use: Never used   Substance Use Topics    Alcohol use: Yes     Comment: occasional, less than monthly    Drug use: Not Currently     Types: Opiates         Review of Systems  Review of Systems   Constitutional:  Negative for activity change, fever and unexpected weight change.   Respiratory:  Negative for cough and shortness of breath.    Cardiovascular:  Negative for chest pain.   Gastrointestinal:  Negative for diarrhea, nausea and vomiting.   Genitourinary:  Negative for dysuria.   Musculoskeletal:  Negative for arthralgias and myalgias.   Skin:  Negative for rash.   Neurological:  Negative for

## 2024-03-10 ENCOUNTER — APPOINTMENT (OUTPATIENT)
Facility: HOSPITAL | Age: 29
End: 2024-03-10
Payer: MEDICAID

## 2024-03-10 ENCOUNTER — HOSPITAL ENCOUNTER (EMERGENCY)
Facility: HOSPITAL | Age: 29
Discharge: LEFT BEFORE DISCHARGE | End: 2024-03-10
Attending: STUDENT IN AN ORGANIZED HEALTH CARE EDUCATION/TRAINING PROGRAM
Payer: MEDICAID

## 2024-03-10 VITALS
BODY MASS INDEX: 23.92 KG/M2 | RESPIRATION RATE: 16 BRPM | OXYGEN SATURATION: 100 % | HEART RATE: 77 BPM | SYSTOLIC BLOOD PRESSURE: 115 MMHG | DIASTOLIC BLOOD PRESSURE: 89 MMHG | HEIGHT: 62 IN | TEMPERATURE: 98.8 F | WEIGHT: 130 LBS

## 2024-03-10 DIAGNOSIS — N93.9 VAGINAL BLEEDING: Primary | ICD-10-CM

## 2024-03-10 LAB
BASOPHILS # BLD: 0.1 K/UL (ref 0–0.1)
BASOPHILS NFR BLD: 1 % (ref 0–2)
DIFFERENTIAL METHOD BLD: ABNORMAL
EOSINOPHIL # BLD: 0.2 K/UL (ref 0–0.4)
EOSINOPHIL NFR BLD: 2 % (ref 0–5)
ERYTHROCYTE [DISTWIDTH] IN BLOOD BY AUTOMATED COUNT: 13 % (ref 11.6–14.5)
HCG SERPL-ACNC: 2 MIU/ML (ref 0–10)
HCT VFR BLD AUTO: 37.6 % (ref 35–45)
HGB BLD-MCNC: 12.7 G/DL (ref 12–16)
IMM GRANULOCYTES # BLD AUTO: 0 K/UL (ref 0–0.04)
IMM GRANULOCYTES NFR BLD AUTO: 0 % (ref 0–0.5)
LYMPHOCYTES # BLD: 2.4 K/UL (ref 0.9–3.6)
LYMPHOCYTES NFR BLD: 30 % (ref 21–52)
MCH RBC QN AUTO: 30.6 PG (ref 24–34)
MCHC RBC AUTO-ENTMCNC: 33.8 G/DL (ref 31–37)
MCV RBC AUTO: 90.6 FL (ref 78–100)
MONOCYTES # BLD: 0.4 K/UL (ref 0.05–1.2)
MONOCYTES NFR BLD: 5 % (ref 3–10)
NEUTS SEG # BLD: 5 K/UL (ref 1.8–8)
NEUTS SEG NFR BLD: 63 % (ref 40–73)
NRBC # BLD: 0 K/UL (ref 0–0.01)
NRBC BLD-RTO: 0 PER 100 WBC
PLATELET # BLD AUTO: 174 K/UL (ref 135–420)
PMV BLD AUTO: 12.7 FL (ref 9.2–11.8)
RBC # BLD AUTO: 4.15 M/UL (ref 4.2–5.3)
WBC # BLD AUTO: 8 K/UL (ref 4.6–13.2)

## 2024-03-10 PROCEDURE — 4500000002 HC ER NO CHARGE

## 2024-03-10 PROCEDURE — 84702 CHORIONIC GONADOTROPIN TEST: CPT

## 2024-03-10 PROCEDURE — 85025 COMPLETE CBC W/AUTO DIFF WBC: CPT

## 2024-03-10 ASSESSMENT — PAIN SCALES - GENERAL: PAINLEVEL_OUTOF10: 6

## 2024-03-10 ASSESSMENT — PAIN - FUNCTIONAL ASSESSMENT: PAIN_FUNCTIONAL_ASSESSMENT: 0-10

## 2024-03-10 NOTE — ED NOTES
Patient stated that she did not want an IV placed that she only wanted blood work done and no IV. RN austin blood with straight stick.

## 2024-03-10 NOTE — ED TRIAGE NOTES
Patient believes she is having a miscarriage stated she had a positive pregnancy test one week ago here at this ED. Patient stated bleeding started last night. Patient stated she has had a period every month and was surprised to have a positive pregnancy test.

## 2024-03-24 NOTE — PROGRESS NOTES
Izaiah Rodriguez is a 32 y.o. female is seen on 10/12/2023 for Establish Care, Migraine (Pt c/o having migraines that causes problems with her eyes. States she sees black spots and having blurry vision. States she has seen an eye doctor last week that said there was nothing wrong with her eyes, it had to do with her migraines she's having. Currently has a headache. Pt also feeling pressure type pain surrounding eyes. No aggravators. No relievers. Treating with BC powder.), Eye Problem (Pt c/o having blurred vision without headaches. States she sees black spots, fuzzy vision. Onset 1 1/2 months. Located in both eyes. States she has went to the hospital for fuzzy vision and they informed her K+ levels were low. Pt feels pressure behind eyes occasionally. Aggravated with headaches but sometimes she can have blurry vision with out a headache. ), Blood Pressure Check (Pt states she was told her bp 127/80 was high for her age. ), Menstrual Problem (Pt states her cycle has been irregular for 1 year. Having menstrual cycle every month however they can be about a week off from each other. ), Anxiety, and Depression    Assessment & Plan:     1. Blurry vision  Assessment & Plan:  Neuro exam within normal limits  Continue management per ophthalmology  Referral to neurology for management  Check CMP to r/o electrolyte imbalance  Orders:  -     Comprehensive Metabolic Panel; Future  -     CBC with Auto Differential; Future  -     TSH; Future  -     Amb External Referral To Neurology  2. Migraine without aura and without status migrainosus, not intractable  Assessment & Plan:  Neuro exam within normal limits  Referral to neurology for management   Orders:  -     Comprehensive Metabolic Panel; Future  -     CBC with Auto Differential; Future  -     TSH; Future  -     Amb External Referral To Neurology  3.  Irregular periods  Assessment & Plan:  Check CBC to r/o anemia  Check TSH to r/o thyroid disorder  Advised pt to f/u with
better off dead, or of hurting yourself in some way 0   PHQ-9 Total Score 13   If you checked off any problems, how difficult have these problems made it for you to do your work, take care of things at home, or get along with other people? 1        EZEKIEL 7-Anxiety       10/12/2023     8:59 AM   EZEKIEL-7 SCREENING   Feeling nervous, anxious, or on edge Nearly every day   Not being able to stop or control worrying Nearly every day   Worrying too much about different things Nearly every day   Trouble relaxing Nearly every day   Being so restless that it is hard to sit still Several days   Becoming easily annoyed or irritable Nearly every day   Feeling afraid as if something awful might happen Several days   EZEKIEL-7 Total Score 17   How difficult have these problems made it for you to do your work, take care of things at home, or get along with other people? Somewhat difficult        Learning Assessment:  No question data found. Fall Risk       No data to display                   Travel Screening:    Travel Screening       Question Response    Have you been in contact with someone who was sick? Yes    Do you have any of the following new or worsening symptoms? None of these    Have you traveled internationally or domestically in the last month? No          Travel History   Travel since 09/12/23    No documented travel since 09/12/23          Health Maintenance reviewed and discussed and ordered per Provider.   Social Determinants of Health     Tobacco Use: High Risk (10/12/2023)    Patient History     Smoking Tobacco Use: Every Day     Smokeless Tobacco Use: Never     Passive Exposure: Not on file   Alcohol Use: Not At Risk (9/4/2023)    AUDIT-C     Frequency of Alcohol Consumption: Never     Average Number of Drinks: Patient does not drink     Frequency of Binge Drinking: Never   Financial Resource Strain: High Risk (10/12/2023)    Overall Financial Resource Strain (CARDIA)     Difficulty of Paying Living Expenses: Hard
Alert and oriented to person, place and time/Patient baseline mental status

## 2024-08-18 ENCOUNTER — HOSPITAL ENCOUNTER (EMERGENCY)
Facility: HOSPITAL | Age: 29
Discharge: HOME | End: 2024-08-18
Attending: STUDENT IN AN ORGANIZED HEALTH CARE EDUCATION/TRAINING PROGRAM
Payer: MEDICAID

## 2024-08-18 ENCOUNTER — APPOINTMENT (OUTPATIENT)
Facility: HOSPITAL | Age: 29
End: 2024-08-18
Payer: MEDICAID

## 2024-08-18 VITALS
BODY MASS INDEX: 26.68 KG/M2 | DIASTOLIC BLOOD PRESSURE: 65 MMHG | OXYGEN SATURATION: 100 % | HEART RATE: 73 BPM | TEMPERATURE: 98 F | HEIGHT: 62 IN | RESPIRATION RATE: 16 BRPM | WEIGHT: 145 LBS | SYSTOLIC BLOOD PRESSURE: 118 MMHG

## 2024-08-18 DIAGNOSIS — O20.9 VAGINAL BLEEDING IN PREGNANCY, FIRST TRIMESTER: Primary | ICD-10-CM

## 2024-08-18 LAB
ABO + RH BLD: NORMAL
ALBUMIN SERPL-MCNC: 3.5 G/DL (ref 3.4–5)
ALBUMIN/GLOB SERPL: 0.9 (ref 0.8–1.7)
ALP SERPL-CCNC: 66 U/L (ref 45–117)
ALT SERPL-CCNC: 19 U/L (ref 13–56)
ANION GAP SERPL CALC-SCNC: 7 MMOL/L (ref 3–18)
APTT PPP: 29.5 SEC (ref 23–36.4)
AST SERPL-CCNC: 29 U/L (ref 10–38)
BASOPHILS # BLD: 0.1 K/UL (ref 0–0.1)
BASOPHILS NFR BLD: 1 % (ref 0–2)
BILIRUB SERPL-MCNC: 0.3 MG/DL (ref 0.2–1)
BLOOD GROUP ANTIBODIES SERPL: NORMAL
BUN SERPL-MCNC: 10 MG/DL (ref 7–18)
BUN/CREAT SERPL: 20 (ref 12–20)
CALCIUM SERPL-MCNC: 8.9 MG/DL (ref 8.5–10.1)
CHLORIDE SERPL-SCNC: 105 MMOL/L (ref 100–111)
CO2 SERPL-SCNC: 24 MMOL/L (ref 21–32)
CREAT SERPL-MCNC: 0.5 MG/DL (ref 0.6–1.3)
DIFFERENTIAL METHOD BLD: ABNORMAL
EOSINOPHIL # BLD: 0.6 K/UL (ref 0–0.4)
EOSINOPHIL NFR BLD: 6 % (ref 0–5)
ERYTHROCYTE [DISTWIDTH] IN BLOOD BY AUTOMATED COUNT: 13.6 % (ref 11.6–14.5)
GLOBULIN SER CALC-MCNC: 3.8 G/DL (ref 2–4)
GLUCOSE SERPL-MCNC: 74 MG/DL (ref 74–99)
HCG SERPL-ACNC: ABNORMAL MIU/ML (ref 0–10)
HCT VFR BLD AUTO: 34.3 % (ref 35–45)
HGB BLD-MCNC: 11.9 G/DL (ref 12–16)
IMM GRANULOCYTES # BLD AUTO: 0 K/UL (ref 0–0.04)
IMM GRANULOCYTES NFR BLD AUTO: 0 % (ref 0–0.5)
INR PPP: 1 (ref 0.9–1.1)
LYMPHOCYTES # BLD: 2.8 K/UL (ref 0.9–3.6)
LYMPHOCYTES NFR BLD: 28 % (ref 21–52)
MCH RBC QN AUTO: 30.7 PG (ref 24–34)
MCHC RBC AUTO-ENTMCNC: 34.7 G/DL (ref 31–37)
MCV RBC AUTO: 88.6 FL (ref 78–100)
MONOCYTES # BLD: 0.6 K/UL (ref 0.05–1.2)
MONOCYTES NFR BLD: 6 % (ref 3–10)
NEUTS SEG # BLD: 5.9 K/UL (ref 1.8–8)
NEUTS SEG NFR BLD: 59 % (ref 40–73)
NRBC # BLD: 0 K/UL (ref 0–0.01)
NRBC BLD-RTO: 0 PER 100 WBC
PLATELET # BLD AUTO: 128 K/UL (ref 135–420)
PMV BLD AUTO: 12.9 FL (ref 9.2–11.8)
POTASSIUM SERPL-SCNC: 4.5 MMOL/L (ref 3.5–5.5)
PROT SERPL-MCNC: 7.3 G/DL (ref 6.4–8.2)
PROTHROMBIN TIME: 12.9 SEC (ref 11.9–14.9)
RBC # BLD AUTO: 3.87 M/UL (ref 4.2–5.3)
SODIUM SERPL-SCNC: 136 MMOL/L (ref 136–145)
SPECIMEN EXP DATE BLD: NORMAL
WBC # BLD AUTO: 10 K/UL (ref 4.6–13.2)

## 2024-08-18 PROCEDURE — 99284 EMERGENCY DEPT VISIT MOD MDM: CPT

## 2024-08-18 PROCEDURE — 84702 CHORIONIC GONADOTROPIN TEST: CPT

## 2024-08-18 PROCEDURE — 86850 RBC ANTIBODY SCREEN: CPT

## 2024-08-18 PROCEDURE — 85025 COMPLETE CBC W/AUTO DIFF WBC: CPT

## 2024-08-18 PROCEDURE — 93975 VASCULAR STUDY: CPT

## 2024-08-18 PROCEDURE — 86900 BLOOD TYPING SEROLOGIC ABO: CPT

## 2024-08-18 PROCEDURE — 85730 THROMBOPLASTIN TIME PARTIAL: CPT

## 2024-08-18 PROCEDURE — 80053 COMPREHEN METABOLIC PANEL: CPT

## 2024-08-18 PROCEDURE — 85610 PROTHROMBIN TIME: CPT

## 2024-08-18 PROCEDURE — 86901 BLOOD TYPING SEROLOGIC RH(D): CPT

## 2024-08-18 RX ORDER — ACETAMINOPHEN 325 MG/1
650 TABLET ORAL
Status: DISCONTINUED | OUTPATIENT
Start: 2024-08-18 | End: 2024-08-18

## 2024-08-18 RX ORDER — 0.9 % SODIUM CHLORIDE 0.9 %
1000 INTRAVENOUS SOLUTION INTRAVENOUS ONCE
Status: DISCONTINUED | OUTPATIENT
Start: 2024-08-18 | End: 2024-08-18

## 2024-08-18 ASSESSMENT — PAIN - FUNCTIONAL ASSESSMENT: PAIN_FUNCTIONAL_ASSESSMENT: 0-10

## 2024-08-18 ASSESSMENT — PAIN SCALES - GENERAL: PAINLEVEL_OUTOF10: 7

## 2024-08-18 NOTE — ED NOTES
Patient greeted and introduced self as the Primary Care Nurse. Assessment in progress. Patient awake and alert, tracks staff in room, and focuses on present commands. Call bell within easy reach. Bed in low position with casters locked. S OOB to prevent fall risk. Patient instructed of NPO status until further notice, and that the Provider will discuss the POC with patient. Patient made as comfortable as possible by assisting with repositioning, HOB elevated, warm blankets provided and lights dimmed for comfort.

## 2024-08-18 NOTE — ED PROVIDER NOTES
EMERGENCY DEPARTMENT HISTORY AND PHYSICAL EXAM    7:09 PM      Date: 8/18/2024  Patient Name: Catalina Minaya    History of Presenting Illness     Chief Complaint   Patient presents with    Pelvic Pain     Left side cramp at 11 weeks pregnant    Back Pain       History From: Patient    Catalina Minaya is a 28 y.o. female   HPI   SEE MDM  Nursing Notes were all reviewed and agreed with or any disagreements were addressed in the HPI.    PCP: Clemencia Ross NP-C    No current facility-administered medications for this encounter.     No current outpatient medications on file.       Past History     Past Medical History:  Past Medical History:   Diagnosis Date    Asthma 1998    Dysmenorrhea in the adolescent 8/2/2011    Gonorrhea        Past Surgical History:  History reviewed. No pertinent surgical history.    Family History:  Family History   Problem Relation Age of Onset    Asthma Paternal Grandmother     Stroke Paternal Grandmother     Hypertension Paternal Grandmother     Diabetes Paternal Grandmother     Asthma Father     Asthma Brother     Asthma Paternal Uncle        Social History:  Social History     Tobacco Use    Smoking status: Every Day     Types: Cigarettes, Cigars    Smokeless tobacco: Never   Vaping Use    Vaping status: Never Used   Substance Use Topics    Alcohol use: Yes     Comment: occasional, less than monthly    Drug use: Not Currently     Types: Opiates        Allergies:  Allergies   Allergen Reactions    Seasonal      Sneezing, runny nose         Review of Systems     General: Well appearing  Eye: No vision changes  ENT: No sore throat  Respiratory: No shortness of breath  Cardiovascular: No chest pain  Gastrointestinal: + Lower abdominal pain  : No dysuria  Integumentary: No rashes  MSK: No joint pain  Neurological: No changes in mental status  Psychiatric: No behavior changes   Review of Systems      Physical Exam   /65   Pulse 73   Temp 98 °F (36.7 °C)   Resp 16   Ht

## 2024-08-18 NOTE — ED TRIAGE NOTES
11 weeks pregnant and c/o smal amount of vaginal spotting of blood 3 days ago. Denies any bleeding since. She c/o discomfort to left pelvic area, lower back, and left thigh. Denies urinary pain or frequency or abscess. She states currently has a vaginal BV and taking medication for it. She is concerned for possible miscarriage.She is requesting an ultrasound. She states she is scheduled for US September 27th.

## 2024-08-18 NOTE — ED NOTES
Patient instructed need for urine specimen, location of bathroom and urine specimen cups. Significant other at bedside. Patient refusing blood work, IVFs and meds. Provider informed.

## 2024-08-18 NOTE — ED NOTES
Bedside shift reported off to oncoming shift RN (Breann Herrera RN) to include pending test results and upcoming US testing

## 2024-08-19 NOTE — PROGRESS NOTES
9:13 PM : Pt care transferred to me from Dr. Guerrier  ,ED provider. History of patient complaint(s), available diagnostic reports and current treatment plan has been discussed thoroughly.   Bedside rounding on patient occured : No .  Intended disposition of patient : Home  Pending diagnostics reports and/or labs (please list):   ED Course as of 243   Sun Aug 18, 2024   1850 Pt is pending bloodwork for RH alloimmunization, UA for asymptomatic bactiuria during pregnancy, basics for anemia/electrolyte imbalance, and TVUS for OB pathology. The patient will [facility] for continued care at 1900.   [CK]   1857  2 days of intermittent left pelvic cramping and intermittent vaginal spotting. Declined pelvic exam. Labs pendings, Rh-, give Rhogam, tx for asymptomatic bacteriuria, TVUS pending [JM]      ED Course User Index  [CK] Vinod Guerrier MD  [JM] Willis Erickson DO     Ultrasound shows single acute pregnancy at 11 weeks.  Type and screen still pending.  Patient decided to elope from the emergency department prior to the results of the type and screen after the nurse had mentioned to her that you are still waiting for the results of this test.  Plan was to the administer RhoGAM if blood type was negative.  I was not given a chance to speak with her prior to her departure.    Willis Erickson DO  Emergency Physician  .[facility]

## 2024-08-19 NOTE — ED NOTES
Patient discharged at this time. This RN reviewed discharge instructions at this time with the patient.  patient verbalized understanding and does not have any questions. Pt ambulatory upon discharge, & in stable condition. Pt armband removed & shredded. Patient I.V was discharged. Patient is ambulatory at discharged.     Pt refused to wait for Lab results on Type and Screen. Provider Dr. Erickson was made aware.